# Patient Record
Sex: FEMALE | Race: WHITE | NOT HISPANIC OR LATINO | Employment: OTHER | URBAN - METROPOLITAN AREA
[De-identification: names, ages, dates, MRNs, and addresses within clinical notes are randomized per-mention and may not be internally consistent; named-entity substitution may affect disease eponyms.]

---

## 2017-04-06 ENCOUNTER — OFFICE VISIT (OUTPATIENT)
Dept: CARDIOLOGY | Facility: MEDICAL CENTER | Age: 82
End: 2017-04-06
Payer: MEDICARE

## 2017-04-06 ENCOUNTER — NON-PROVIDER VISIT (OUTPATIENT)
Dept: CARDIOLOGY | Facility: MEDICAL CENTER | Age: 82
End: 2017-04-06
Payer: MEDICARE

## 2017-04-06 VITALS
OXYGEN SATURATION: 92 % | BODY MASS INDEX: 27.11 KG/M2 | SYSTOLIC BLOOD PRESSURE: 102 MMHG | HEIGHT: 63 IN | WEIGHT: 153 LBS | DIASTOLIC BLOOD PRESSURE: 64 MMHG | HEART RATE: 88 BPM

## 2017-04-06 DIAGNOSIS — I49.5 SICK SINUS SYNDROME (HCC): ICD-10-CM

## 2017-04-06 DIAGNOSIS — I27.20 PULMONARY HTN (HCC): Chronic | ICD-10-CM

## 2017-04-06 DIAGNOSIS — E87.6 HYPOKALEMIA: ICD-10-CM

## 2017-04-06 DIAGNOSIS — Z95.0 CARDIAC PACEMAKER IN SITU: ICD-10-CM

## 2017-04-06 DIAGNOSIS — I10 ESSENTIAL HYPERTENSION: Chronic | ICD-10-CM

## 2017-04-06 DIAGNOSIS — I48.0 PAROXYSMAL ATRIAL FIBRILLATION (HCC): ICD-10-CM

## 2017-04-06 DIAGNOSIS — E78.2 MIXED HYPERLIPIDEMIA: Chronic | ICD-10-CM

## 2017-04-06 PROCEDURE — 1101F PT FALLS ASSESS-DOCD LE1/YR: CPT | Mod: 8P | Performed by: NURSE PRACTITIONER

## 2017-04-06 PROCEDURE — G8432 DEP SCR NOT DOC, RNG: HCPCS | Performed by: NURSE PRACTITIONER

## 2017-04-06 PROCEDURE — G8419 CALC BMI OUT NRM PARAM NOF/U: HCPCS | Performed by: NURSE PRACTITIONER

## 2017-04-06 PROCEDURE — 1036F TOBACCO NON-USER: CPT | Performed by: NURSE PRACTITIONER

## 2017-04-06 PROCEDURE — 4040F PNEUMOC VAC/ADMIN/RCVD: CPT | Mod: 8P | Performed by: NURSE PRACTITIONER

## 2017-04-06 PROCEDURE — 99214 OFFICE O/P EST MOD 30 MIN: CPT | Performed by: NURSE PRACTITIONER

## 2017-04-06 RX ORDER — POTASSIUM CHLORIDE 750 MG/1
10 TABLET, FILM COATED, EXTENDED RELEASE ORAL DAILY
Qty: 90 TAB | Refills: 3 | COMMUNITY
Start: 2017-04-06 | End: 2017-08-22 | Stop reason: SDUPTHER

## 2017-04-06 RX ORDER — THIAMINE HCL 50 MG
50 TABLET ORAL DAILY
COMMUNITY
End: 2018-06-19

## 2017-04-06 ASSESSMENT — ENCOUNTER SYMPTOMS
PALPITATIONS: 0
MYALGIAS: 0
ORTHOPNEA: 0
CLAUDICATION: 0
SHORTNESS OF BREATH: 0
COUGH: 0
FEVER: 0
PND: 0
DIZZINESS: 0
ABDOMINAL PAIN: 0

## 2017-04-06 NOTE — PROGRESS NOTES
"Subjective:   Carissa Marin is a 87 y.o. female who presents today for 6 month follow up with pacer check.    She is a patient of Dr. Hawley in our office. Hx of HLD, HTN, PAF, and sinus bradycardia with PPM.    She is overall doing very well. She continues to walk and stay active. She has no complaints.    She does have some chest twinges that are sharp at times but she moves her body around and they go away, she thinks \"they are her old muscles.\"    She has had no episodes of chest pain, palpitations, dizziness/lightheadedness, shortness of breath, orthopnea, or peripheral edema.    Past Medical History   Diagnosis Date   • Pain in joint, shoulder region      right shoulder   • Nonspecific abnormal results of thyroid function study    • Hypopotassemia    • Atrial fibrillation (CMS-HCC)    • Routine general medical examination at a health care facility    • Herpes simplex without mention of complication    • ASTHMA    • Hypertension    • Need for prophylactic hormone replacement therapy (postmenopausal)    • Pneumonia, organism unspecified    • Unspecified spontaneous  without mention of complication    • Abdominal pain, other specified site      pelvic   • Hyperlipidemia    • Lump or mass in breast    • Osteoarthrosis, unspecified whether generalized or localized, lower leg    • Other and unspecified hyperlipidemia 2012   • AF (atrial fibrillation) (CMS-[facility]) 2012   • Hyperglycemia 2012   • Colon polyps 2012   • Palpitations 10/1/2012   • Sinus bradycardia 10/1/2012   • Pulmonary hypertension (CMS-[facility])      Past Surgical History   Procedure Laterality Date   • Abdominal hysterectomy total       tahbso in [place] , not cancerous   • Tonsillectomy     • Dilation and curettage     • Pacemaker insertion       Family History   Problem Relation Age of Onset   • Heart Disease Father      CAD     History   Smoking status   • Never Smoker    Smokeless tobacco   • Never Used     Allergies " "  Allergen Reactions   • Niacin      Bad hot rush, \"on fire\"     Outpatient Encounter Prescriptions as of 4/6/2017   Medication Sig Dispense Refill   • thiamine (THIAMINE) 50 MG Tab Take 50 mg by mouth every day.     • potassium chloride ER (KLOR-CON) 10 MEQ tablet Take 1 Tab by mouth every day. 90 Tab 3   • atorvastatin (LIPITOR) 20 MG Tab Take 1 Tab by mouth every day. 90 Tab 3   • valsartan-hydrochlorothiazide (DIOVAN-HCT) 160-12.5 MG per tablet Take 1 Tab by mouth every day.     • Magnesium 400 MG CAPS Take  by mouth every day.     • aspirin EC (ECOTRIN) 81 MG TBEC Take 81 mg by mouth every day.     • glucosamine Sulfate 500 MG CAPS Take 500 mg by mouth 2 times a day, with meals.       • VITAMIN D, CHOLECALCIFEROL, PO Take  by mouth.       • [DISCONTINUED] potassium chloride ER (KLOR-CON) 10 MEQ tablet Take 1 Tab by mouth 2 times a day. (Patient taking differently: Take 10 mEq by mouth every day.) 180 Tab 1     No facility-administered encounter medications on file as of 4/6/2017.     Review of Systems   Constitutional: Negative for fever and malaise/fatigue.   Respiratory: Negative for cough and shortness of breath.    Cardiovascular: Negative for chest pain, palpitations, orthopnea, claudication, leg swelling and PND.   Gastrointestinal: Negative for abdominal pain.   Musculoskeletal: Negative for myalgias.   Neurological: Negative for dizziness.   All other systems reviewed and are negative.       Objective:   /64 mmHg  Pulse 88  Ht 1.6 m (5' 3\")  Wt 69.4 kg (153 lb)  BMI 27.11 kg/m2  SpO2 92%    Physical Exam   Constitutional: She is oriented to person, place, and time. She appears well-developed and well-nourished. No distress.   HENT:   Head: Normocephalic and atraumatic.   Eyes: EOM are normal.   Neck: Normal range of motion. No JVD present.   Cardiovascular: Normal rate, regular rhythm, normal heart sounds and intact distal pulses.    No murmur heard.  Pulmonary/Chest: Effort normal and breath " sounds normal. No respiratory distress.   Abdominal: Soft. Bowel sounds are normal.   Musculoskeletal: Normal range of motion. She exhibits no edema.   Neurological: She is alert and oriented to person, place, and time.   Skin: Skin is warm and dry.   Psychiatric: She has a normal mood and affect.   Nursing note and vitals reviewed.    Lab Results   Component Value Date/Time    CHOLESTEROL, 03/31/2017 08:30 AM    LDL 80 03/31/2017 08:30 AM    HDL 70 03/31/2017 08:30 AM    TRIGLYCERIDES 70 03/31/2017 08:30 AM       Lab Results   Component Value Date/Time    SODIUM 139 03/31/2017 08:30 AM    POTASSIUM 4.3 03/31/2017 08:30 AM    CHLORIDE 105 03/31/2017 08:30 AM    CO2 28 03/31/2017 08:30 AM    GLUCOSE 106* 03/31/2017 08:30 AM    BUN 22* 03/31/2017 08:30 AM    CREATININE 1.0 03/31/2017 08:30 AM     Lab Results   Component Value Date/Time    ALKALINE PHOSPHATASE 79 03/31/2017 08:30 AM    AST(SGOT) 14 03/31/2017 08:30 AM    ALT(SGPT) 22 03/31/2017 08:30 AM    TOTAL BILIRUBIN 0.6 03/31/2017 08:30 AM      Assessment:     1. Paroxysmal atrial fibrillation (CMS-HCC)     2. Cardiac pacemaker in situ     3. Essential hypertension     4. Pulmonary HTN (CMS-HCC)     5. Mixed hyperlipidemia     6. Hypokalemia  potassium chloride ER (KLOR-CON) 10 MEQ tablet     Medical Decision Making:  Today's Assessment / Status / Plan:     1. PAF, no events per device check. Continue to follow on device checks. ASA only.    2. PPM, good battery life and no mode switching. Q6 month checks.    3. HTN, great control, low side of normal. Continue valsartan-HCTZ 160-12.5 mg QD. Wouldn't increase dosing unless warranted by SBP >140 consistently. She will call if SBP <100.    4. Pulmonary HTN, no recent echo. Previous smoking hx. Asymptomatic. Follow clinically and echo only if symptoms progress.    5. HLD, great control on lipitor. LDL goal <100. Follow with yearly CMP and lipid.    FU in clinic in 6 months with ST with pacer    Patient  verbalizes understanding and agrees with the plan of care.     Collaborating MD: Quinten GUEVARA    Spent 45 minutes in face-to-face patient contact in which greater than 50% of the visit was spent in counseling/coordination of care of medication review, symptom review, discussion of medications and plan of care.

## 2017-04-06 NOTE — MR AVS SNAPSHOT
"        Carissa Tomas   2017 2:20 PM   Office Visit   MRN: [id number]    Department:  [facility]   Dept Phone:  [phone]    Description:  Female : 1930   Provider:  MEENU Vaughn           Reason for Visit     Follow-Up           Allergies as of 2017     Allergen Noted Reactions    Niacin 2012       Bad hot rush, \"on fire\"      You were diagnosed with     Paroxysmal atrial fibrillation (CMS-HCC)   [076359]       Cardiac pacemaker in situ   [V45.01.ICD-9-CM]       Essential hypertension   [8843462]       Pulmonary HTN (CMS-HCC)   [219044]       Mixed hyperlipidemia   [272.2.ICD-9-CM]       Hypokalemia   [396192]         Vital Signs     Blood Pressure Pulse Height Weight Body Mass Index Oxygen Saturation    102/64 mmHg 88 1.6 m (5' 3\") 69.4 kg (153 lb) 27.11 kg/m2 92%    Smoking Status                   Never Smoker            Basic Information     Date Of Birth Sex Race Ethnicity Preferred Language    1930 Female White Non- English      Problem List              ICD-10-CM Priority Class Noted - Resolved    Hyperlipemia (Chronic) E78.5 Medium  2012 - Present    AF (atrial fibrillation) (CMS-HCC) I48.91 Medium  2012 - Present    Herpes simplex virus infection (Chronic) B00.9 Low  2012 - Present    HTN (hypertension) (Chronic) I10 Medium  2012 - Present    OA (osteoarthritis) (Chronic) M19.90 Low  2012 - Present    Pulmonary HTN (CMS-HCC) (Chronic) I27.2 Medium  2012 - Present    Colon polyps (Chronic) K63.5 Low  2012 - Present    Cardiac pacemaker in situ Z95.0 Medium  2013 - Present      Health Maintenance        Date Due Completion Dates    IMM DTaP/Tdap/Td Vaccine (1 - Tdap) 1949 ---    PAP SMEAR 1951 ---    MAMMOGRAM 1970 ---    COLONOSCOPY 1980 ---    IMM ZOSTER VACCINE 1990 ---    BONE DENSITY 1995 ---    IMM PNEUMOCOCCAL 65+ (ADULT) LOW/MEDIUM RISK SERIES (1 of 2 - PCV13) 1995 " ---            Current Immunizations     No immunizations on file.      Below and/or attached are the medications your provider expects you to take. Review all of your home medications and newly ordered medications with your provider and/or pharmacist. Follow medication instructions as directed by your provider and/or pharmacist. Please keep your medication list with you and share with your provider. Update the information when medications are discontinued, doses are changed, or new medications (including over-the-counter products) are added; and carry medication information at all times in the event of emergency situations     Allergies:  NIACIN - (reactions not documented)               Medications  Valid as of: April 06, 2017 -  4:11 PM    Generic Name Brand Name Tablet Size Instructions for use    Aspirin (Tablet Delayed Response) ECOTRIN 81 MG Take 81 mg by mouth every day.        Atorvastatin Calcium (Tab) LIPITOR 20 MG Take 1 Tab by mouth every day.        Cholecalciferol   Take  by mouth.          Glucosamine Sulfate (Cap) glucosamine Sulfate 500 MG Take 500 mg by mouth 2 times a day, with meals.          Magnesium (Cap) Magnesium 400 MG Take  by mouth every day.        Potassium Chloride (Tab CR) KLOR-CON 10 MEQ Take 1 Tab by mouth every day.        Thiamine HCl (Tab) THIAMINE 50 MG Take 50 mg by mouth every day.        Valsartan-Hydrochlorothiazide (Tab) DIOVAN--12.5 MG Take 1 Tab by mouth every day.        .                 Medicines prescribed today were sent to:     [facility] - [address] [facility] [address]    Phone: [phone] Fax: [phone]    Open 24 Hours?: No      Medication refill instructions:       If your prescription bottle indicates you have medication refills left, it is not necessary to call your provider’s office. Please contact your pharmacy and they will refill your medication.    If your prescription  bottle indicates you do not have any refills left, you may request refills at any time through one of the following ways: The online Actions system (except Urgent Care), by calling your provider’s office, or by asking your pharmacy to contact your provider’s office with a refill request. Medication refills are processed only during regular business hours and may not be available until the next business day. Your provider may request additional information or to have a follow-up visit with you prior to refilling your medication.   *Please Note: Medication refills are assigned a new Rx number when refilled electronically. Your pharmacy may indicate that no refills were authorized even though a new prescription for the same medication is available at the pharmacy. Please request the medicine by name with the pharmacy before contacting your provider for a refill.           MyChart Status: Patient Declined

## 2017-04-06 NOTE — Clinical Note
"     [facility]   [address], [address]  [place] [address]  Phone: [phone]  Fax: [phone]              Carissa Marin  1930    Encounter Date: 2017    MEENU Vaughn          PROGRESS NOTE:  Subjective:   Carissa Marin is a 87 y.o. female who presents today for 6 month follow up with pacer check.    She is a patient of Dr. Hawlye in our office. Hx of HLD, HTN, PAF, and sinus bradycardia with PPM.    She is overall doing very well. She continues to walk and stay active. She has no complaints.    She does have some chest twinges that are sharp at times but she moves her body around and they go away, she thinks \"they are her old muscles.\"    She has had no episodes of chest pain, palpitations, dizziness/lightheadedness, shortness of breath, orthopnea, or peripheral edema.    Past Medical History   Diagnosis Date   • Pain in joint, shoulder region      right shoulder   • Nonspecific abnormal results of thyroid function study    • Hypopotassemia    • Atrial fibrillation (CMS-HCC)    • Routine general medical examination at a health care facility    • Herpes simplex without mention of complication    • ASTHMA    • Hypertension    • Need for prophylactic hormone replacement therapy (postmenopausal)    • Pneumonia, organism unspecified    • Unspecified spontaneous  without mention of complication    • Abdominal pain, other specified site      pelvic   • Hyperlipidemia    • Lump or mass in breast    • Osteoarthrosis, unspecified whether generalized or localized, lower leg    • Other and unspecified hyperlipidemia 2012   • AF (atrial fibrillation) (CMS-HCC) 2012   • Hyperglycemia 2012   • Colon polyps 2012   • Palpitations 10/1/2012   • Sinus bradycardia 10/1/2012   • Pulmonary hypertension (CMS-[facility])      Past Surgical History   Procedure Laterality Date   • Abdominal hysterectomy total       tahbso in [place] , not " "cancerous   • Tonsillectomy     • Dilation and curettage     • Pacemaker insertion       Family History   Problem Relation Age of Onset   • Heart Disease Father      CAD     History   Smoking status   • Never Smoker    Smokeless tobacco   • Never Used     Allergies   Allergen Reactions   • Niacin      Bad hot rush, \"on fire\"     Outpatient Encounter Prescriptions as of 4/6/2017   Medication Sig Dispense Refill   • thiamine (THIAMINE) 50 MG Tab Take 50 mg by mouth every day.     • potassium chloride ER (KLOR-CON) 10 MEQ tablet Take 1 Tab by mouth every day. 90 Tab 3   • atorvastatin (LIPITOR) 20 MG Tab Take 1 Tab by mouth every day. 90 Tab 3   • valsartan-hydrochlorothiazide (DIOVAN-HCT) 160-12.5 MG per tablet Take 1 Tab by mouth every day.     • Magnesium 400 MG CAPS Take  by mouth every day.     • aspirin EC (ECOTRIN) 81 MG TBEC Take 81 mg by mouth every day.     • glucosamine Sulfate 500 MG CAPS Take 500 mg by mouth 2 times a day, with meals.       • VITAMIN D, CHOLECALCIFEROL, PO Take  by mouth.       • [DISCONTINUED] potassium chloride ER (KLOR-CON) 10 MEQ tablet Take 1 Tab by mouth 2 times a day. (Patient taking differently: Take 10 mEq by mouth every day.) 180 Tab 1     No facility-administered encounter medications on file as of 4/6/2017.     Review of Systems   Constitutional: Negative for fever and malaise/fatigue.   Respiratory: Negative for cough and shortness of breath.    Cardiovascular: Negative for chest pain, palpitations, orthopnea, claudication, leg swelling and PND.   Gastrointestinal: Negative for abdominal pain.   Musculoskeletal: Negative for myalgias.   Neurological: Negative for dizziness.   All other systems reviewed and are negative.       Objective:   /64 mmHg  Pulse 88  Ht 1.6 m (5' 3\")  Wt 69.4 kg (153 lb)  BMI 27.11 kg/m2  SpO2 92%    Physical Exam   Constitutional: She is oriented to person, place, and time. She appears well-developed and well-nourished. No distress.   HENT: "   Head: Normocephalic and atraumatic.   Eyes: EOM are normal.   Neck: Normal range of motion. No JVD present.   Cardiovascular: Normal rate, regular rhythm, normal heart sounds and intact distal pulses.    No murmur heard.  Pulmonary/Chest: Effort normal and breath sounds normal. No respiratory distress.   Abdominal: Soft. Bowel sounds are normal.   Musculoskeletal: Normal range of motion. She exhibits no edema.   Neurological: She is alert and oriented to person, place, and time.   Skin: Skin is warm and dry.   Psychiatric: She has a normal mood and affect.   Nursing note and vitals reviewed.    Lab Results   Component Value Date/Time    CHOLESTEROL, 03/31/2017 08:30 AM    LDL 80 03/31/2017 08:30 AM    HDL 70 03/31/2017 08:30 AM    TRIGLYCERIDES 70 03/31/2017 08:30 AM       Lab Results   Component Value Date/Time    SODIUM 139 03/31/2017 08:30 AM    POTASSIUM 4.3 03/31/2017 08:30 AM    CHLORIDE 105 03/31/2017 08:30 AM    CO2 28 03/31/2017 08:30 AM    GLUCOSE 106* 03/31/2017 08:30 AM    BUN 22* 03/31/2017 08:30 AM    CREATININE 1.0 03/31/2017 08:30 AM     Lab Results   Component Value Date/Time    ALKALINE PHOSPHATASE 79 03/31/2017 08:30 AM    AST(SGOT) 14 03/31/2017 08:30 AM    ALT(SGPT) 22 03/31/2017 08:30 AM    TOTAL BILIRUBIN 0.6 03/31/2017 08:30 AM      Assessment:     1. Paroxysmal atrial fibrillation (CMS-[facility])     2. Cardiac pacemaker in situ     3. Essential hypertension     4. Pulmonary HTN (CMS-[facility])     5. Mixed hyperlipidemia     6. Hypokalemia  potassium chloride ER (KLOR-CON) 10 MEQ tablet     Medical Decision Making:  Today's Assessment / Status / Plan:     1. PAF, no events per device check. Continue to follow on device checks. ASA only.    2. PPM, good battery life and no mode switching. Q6 month checks.    3. HTN, great control, low side of normal. Continue valsartan-HCTZ 160-12.5 mg QD. Wouldn't increase dosing unless warranted by SBP >140 consistently. She will call if SBP <100.    4.  Pulmonary HTN, no recent echo. Previous smoking hx. Asymptomatic. Follow clinically and echo only if symptoms progress.    5. HLD, great control on lipitor. LDL goal <100. Follow with yearly CMP and lipid.    FU in clinic in 6 months with ST with pacer    Patient verbalizes understanding and agrees with the plan of care.     Collaborating MD: Quinten GUEVARA    Spent 45 minutes in face-to-face patient contact in which greater than 50% of the visit was spent in counseling/coordination of care of medication review, symptom review, discussion of medications and plan of care.        No Recipients

## 2017-05-30 DIAGNOSIS — I10 ESSENTIAL HYPERTENSION: Chronic | ICD-10-CM

## 2017-05-30 DIAGNOSIS — E78.5 HYPERLIPIDEMIA, UNSPECIFIED HYPERLIPIDEMIA TYPE: ICD-10-CM

## 2017-05-30 RX ORDER — ATORVASTATIN CALCIUM 20 MG/1
20 TABLET, FILM COATED ORAL DAILY
Qty: 90 TAB | Refills: 3 | Status: SHIPPED | OUTPATIENT
Start: 2017-05-30 | End: 2017-09-21 | Stop reason: SDUPTHER

## 2017-08-22 DIAGNOSIS — E87.6 HYPOKALEMIA: ICD-10-CM

## 2017-08-22 RX ORDER — POTASSIUM CHLORIDE 750 MG/1
10 TABLET, FILM COATED, EXTENDED RELEASE ORAL DAILY
Qty: 90 TAB | Refills: 3 | Status: SHIPPED | OUTPATIENT
Start: 2017-08-22 | End: 2018-06-19

## 2017-09-21 ENCOUNTER — TELEPHONE (OUTPATIENT)
Dept: CARDIOLOGY | Facility: MEDICAL CENTER | Age: 82
End: 2017-09-21

## 2017-09-21 DIAGNOSIS — I10 ESSENTIAL HYPERTENSION: Chronic | ICD-10-CM

## 2017-09-21 DIAGNOSIS — E78.5 HYPERLIPIDEMIA, UNSPECIFIED HYPERLIPIDEMIA TYPE: ICD-10-CM

## 2017-09-21 RX ORDER — ATORVASTATIN CALCIUM 20 MG/1
20 TABLET, FILM COATED ORAL DAILY
Qty: 90 TAB | Refills: 0 | Status: SHIPPED | OUTPATIENT
Start: 2017-09-21 | End: 2017-12-11 | Stop reason: SDUPTHER

## 2017-09-21 NOTE — TELEPHONE ENCOUNTER
----- Message from Annmarie Durham sent at 9/21/2017  9:17 AM PDT -----  Regarding: Patient lost prescription  [facility]/Jorge    Patient lost her prescription for Atorvastatin and wants a refill called in to [facility]. She can be reached at [phone].

## 2017-11-28 ENCOUNTER — TELEPHONE (OUTPATIENT)
Dept: CARDIOLOGY | Facility: MEDICAL CENTER | Age: 82
End: 2017-11-28

## 2017-11-28 ENCOUNTER — NON-PROVIDER VISIT (OUTPATIENT)
Dept: CARDIOLOGY | Facility: MEDICAL CENTER | Age: 82
End: 2017-11-28
Payer: MEDICARE

## 2017-11-28 ENCOUNTER — OFFICE VISIT (OUTPATIENT)
Dept: CARDIOLOGY | Facility: MEDICAL CENTER | Age: 82
End: 2017-11-28
Payer: MEDICARE

## 2017-11-28 VITALS
BODY MASS INDEX: 26.58 KG/M2 | HEIGHT: 63 IN | HEART RATE: 82 BPM | WEIGHT: 150 LBS | SYSTOLIC BLOOD PRESSURE: 122 MMHG | OXYGEN SATURATION: 94 % | DIASTOLIC BLOOD PRESSURE: 70 MMHG

## 2017-11-28 DIAGNOSIS — E78.2 MIXED HYPERLIPIDEMIA: Chronic | ICD-10-CM

## 2017-11-28 DIAGNOSIS — I49.5 SICK SINUS SYNDROME (HCC): ICD-10-CM

## 2017-11-28 DIAGNOSIS — I27.20 PULMONARY HTN (HCC): Chronic | ICD-10-CM

## 2017-11-28 DIAGNOSIS — I48.0 PAROXYSMAL ATRIAL FIBRILLATION (HCC): ICD-10-CM

## 2017-11-28 DIAGNOSIS — I10 ESSENTIAL HYPERTENSION: Chronic | ICD-10-CM

## 2017-11-28 DIAGNOSIS — Z95.0 CARDIAC PACEMAKER IN SITU: ICD-10-CM

## 2017-11-28 PROCEDURE — 99214 OFFICE O/P EST MOD 30 MIN: CPT | Performed by: NURSE PRACTITIONER

## 2017-11-28 PROCEDURE — 93280 PM DEVICE PROGR EVAL DUAL: CPT | Performed by: INTERNAL MEDICINE

## 2017-11-28 RX ORDER — VALSARTAN 160 MG/1
160 TABLET ORAL DAILY
Qty: 90 TAB | Refills: 3 | Status: SHIPPED | OUTPATIENT
Start: 2017-11-28 | End: 2018-06-21 | Stop reason: SDUPTHER

## 2017-11-28 ASSESSMENT — ENCOUNTER SYMPTOMS
CLAUDICATION: 0
PND: 0
DIZZINESS: 1
PALPITATIONS: 0
ABDOMINAL PAIN: 0
MYALGIAS: 0
SHORTNESS OF BREATH: 0
COUGH: 0
FEVER: 0
ORTHOPNEA: 0

## 2017-11-28 NOTE — TELEPHONE ENCOUNTER
S/w pt's dtr, she just want to make sure that [facility] instructed them that pt will be taking Valsartan 160mg only without HCTZ and pt should stopped KCL after two weeks and repeat blood test in two weeks after KCL was stopped, clarified and confirmed this w/ pt's Dtr based on [facility] OV notes.

## 2017-11-28 NOTE — PROGRESS NOTES
Subjective:   Carissa Marin is a 87 y.o. female who presents today for 6 month follow up with pacer check.    She is a previous patient of Dr. Hawley in our office, we will get her set up with another physician in our group. Hx of HLD, HTN, PAF, and symptomatic bradycardia with PPM placement.    She is overall doing very well. She continues to walk and stay active.     She only complains of mild lightheadedness in the morning. She doesn't drink much water per her daughter.    She has had no episodes of chest pain, palpitations, shortness of breath, orthopnea, or peripheral edema.    Past Medical History:   Diagnosis Date   • Abdominal pain, other specified site     pelvic   • AF (atrial fibrillation) (CMS-[facility]) 2012   • ASTHMA    • Atrial fibrillation (CMS-[facility])    • Colon polyps 2012   • Herpes simplex without mention of complication    • Hyperglycemia 2012   • Hyperlipidemia    • Hypertension    • Hypopotassemia    • Lump or mass in breast    • Need for prophylactic hormone replacement therapy (postmenopausal)    • Nonspecific abnormal results of thyroid function study    • Osteoarthrosis, unspecified whether generalized or localized, lower leg    • Other and unspecified hyperlipidemia 2012   • Pain in joint, shoulder region     right shoulder   • Palpitations 10/1/2012   • Pneumonia, organism unspecified(486)    • Pulmonary hypertension    • Routine general medical examination at a health care facility    • Sinus bradycardia 10/1/2012   • Unspecified spontaneous  without mention of complication      Past Surgical History:   Procedure Laterality Date   • ABDOMINAL HYSTERECTOMY TOTAL      tahbso in [place] , not cancerous   • DILATION AND CURETTAGE     • PACEMAKER INSERTION     • TONSILLECTOMY       Family History   Problem Relation Age of Onset   • Heart Disease Father      CAD     History   Smoking Status   • Never Smoker   Smokeless Tobacco   • Never Used     Allergies   Allergen  "Reactions   • Niacin      Bad hot rush, \"on fire\"     Outpatient Encounter Prescriptions as of 11/28/2017   Medication Sig Dispense Refill   • B Complex Vitamins (B COMPLEX PO) Take  by mouth.     • atorvastatin (LIPITOR) 20 MG Tab Take 1 Tab by mouth every day. 90 Tab 0   • potassium chloride ER (KLOR-CON) 10 MEQ tablet Take 1 Tab by mouth every day. 90 Tab 3   • thiamine (THIAMINE) 50 MG Tab Take 50 mg by mouth every day.     • valsartan-hydrochlorothiazide (DIOVAN-HCT) 160-12.5 MG per tablet Take 1 Tab by mouth every day.     • Magnesium 400 MG CAPS Take  by mouth every day.     • aspirin EC (ECOTRIN) 81 MG TBEC Take 81 mg by mouth every day.     • glucosamine Sulfate 500 MG CAPS Take 500 mg by mouth 2 times a day, with meals.       • VITAMIN D, CHOLECALCIFEROL, PO Take  by mouth.         No facility-administered encounter medications on file as of 11/28/2017.      Review of Systems   Constitutional: Negative for fever and malaise/fatigue.   Respiratory: Negative for cough and shortness of breath.    Cardiovascular: Negative for chest pain, palpitations, orthopnea, claudication, leg swelling and PND.   Gastrointestinal: Negative for abdominal pain.   Musculoskeletal: Negative for myalgias.   Neurological: Positive for dizziness.   All other systems reviewed and are negative.       Objective:   /70   Pulse 82   Ht 1.6 m (5' 3\")   Wt 68 kg (150 lb)   SpO2 94%   BMI 26.57 kg/m²     Physical Exam   Constitutional: She is oriented to person, place, and time. She appears well-developed and well-nourished. No distress.   HENT:   Head: Normocephalic and atraumatic.   Eyes: EOM are normal.   Neck: Normal range of motion. No JVD present.   Cardiovascular: Normal rate, regular rhythm, normal heart sounds and intact distal pulses.    No murmur heard.  Pulmonary/Chest: Effort normal and breath sounds normal. No respiratory distress.   Abdominal: Soft. Bowel sounds are normal.   Musculoskeletal: Normal range of " motion. She exhibits no edema.   Neurological: She is alert and oriented to person, place, and time.   Skin: Skin is warm and dry.   Psychiatric: She has a normal mood and affect.   Nursing note and vitals reviewed.    Lab Results   Component Value Date/Time    CHOLSTRLTOT 154 03/31/2017 08:30 AM    LDL 80 03/31/2017 08:30 AM    HDL 70 03/31/2017 08:30 AM    TRIGLYCERIDE 70 03/31/2017 08:30 AM       Lab Results   Component Value Date/Time    SODIUM 139 03/31/2017 08:30 AM    POTASSIUM 4.3 03/31/2017 08:30 AM    CHLORIDE 105 03/31/2017 08:30 AM    CO2 28 03/31/2017 08:30 AM    GLUCOSE 106 (H) 03/31/2017 08:30 AM    BUN 22 (H) 03/31/2017 08:30 AM    CREATININE 1.0 03/31/2017 08:30 AM     Lab Results   Component Value Date/Time    ALKPHOSPHAT 79 03/31/2017 08:30 AM    ASTSGOT 14 03/31/2017 08:30 AM    ALTSGPT 22 03/31/2017 08:30 AM    TBILIRUBIN 0.6 03/31/2017 08:30 AM      Assessment:     1. Paroxysmal atrial fibrillation (CMS-HCC)     2. Cardiac pacemaker in situ     3. Essential hypertension     4. Mixed hyperlipidemia     5. Pulmonary HTN (CMS-HCC)       Medical Decision Making:  Today's Assessment / Status / Plan:     1. PAF, no events per device check. Continue to follow on device checks. ASA only.    2. PPM, good battery life and no mode switching. Q6 month checks.    3. HTN, great control, low side of normal. Drop HCTZ and continue with just valsartan 160 due to lightheadedness and not staying hydrated. Check BP daily with this change and will check K in a few weeks. If K remains elevated, okay to stop K supp.    4. Pulmonary HTN, no recent echo. Previous smoking hx. Asymptomatic. Follow clinically and echo only if symptoms progress.    5. HLD, great control on lipitor. LDL goal <100. Follow with yearly CMP and lipid.    FU in clinic in 6 months with LA with pacer; yearly CMP and lipid before next apt    Patient verbalizes understanding and agrees with the plan of care.     Collaborating MD: Haider GUEVARA

## 2017-11-28 NOTE — LETTER
[facility]   [address]  [place] [address]  Phone: [phone]  Fax: [phone]              Carissa Marin  1930    Encounter Date: 2017    MEENU Vaughn          PROGRESS NOTE:  Subjective:   Carissa Marin is a 87 y.o. female who presents today for 6 month follow up with pacer check.    She is a previous patient of Dr. Hawley in our office, we will get her set up with another physician in our group. Hx of HLD, HTN, PAF, and symptomatic bradycardia with PPM placement.    She is overall doing very well. She continues to walk and stay active.     She only complains of mild lightheadedness in the morning. She doesn't drink much water per her daughter.    She has had no episodes of chest pain, palpitations, shortness of breath, orthopnea, or peripheral edema.    Past Medical History:   Diagnosis Date   • Abdominal pain, other specified site     pelvic   • AF (atrial fibrillation) (CMS-HCC) 2012   • ASTHMA    • Atrial fibrillation (CMS-HCC)    • Colon polyps 2012   • Herpes simplex without mention of complication    • Hyperglycemia 2012   • Hyperlipidemia    • Hypertension    • Hypopotassemia    • Lump or mass in breast    • Need for prophylactic hormone replacement therapy (postmenopausal)    • Nonspecific abnormal results of thyroid function study    • Osteoarthrosis, unspecified whether generalized or localized, lower leg    • Other and unspecified hyperlipidemia 2012   • Pain in joint, shoulder region     right shoulder   • Palpitations 10/1/2012   • Pneumonia, organism unspecified(486)    • Pulmonary hypertension    • Routine general medical examination at a health care facility    • Sinus bradycardia 10/1/2012   • Unspecified spontaneous  without mention of complication      Past Surgical History:   Procedure Laterality Date   • ABDOMINAL HYSTERECTOMY TOTAL      tahbso in [place] , not  "cancerous   • DILATION AND CURETTAGE     • PACEMAKER INSERTION     • TONSILLECTOMY       Family History   Problem Relation Age of Onset   • Heart Disease Father      CAD     History   Smoking Status   • Never Smoker   Smokeless Tobacco   • Never Used     Allergies   Allergen Reactions   • Niacin      Bad hot rush, \"on fire\"     Outpatient Encounter Prescriptions as of 11/28/2017   Medication Sig Dispense Refill   • B Complex Vitamins (B COMPLEX PO) Take  by mouth.     • atorvastatin (LIPITOR) 20 MG Tab Take 1 Tab by mouth every day. 90 Tab 0   • potassium chloride ER (KLOR-CON) 10 MEQ tablet Take 1 Tab by mouth every day. 90 Tab 3   • thiamine (THIAMINE) 50 MG Tab Take 50 mg by mouth every day.     • valsartan-hydrochlorothiazide (DIOVAN-HCT) 160-12.5 MG per tablet Take 1 Tab by mouth every day.     • Magnesium 400 MG CAPS Take  by mouth every day.     • aspirin EC (ECOTRIN) 81 MG TBEC Take 81 mg by mouth every day.     • glucosamine Sulfate 500 MG CAPS Take 500 mg by mouth 2 times a day, with meals.       • VITAMIN D, CHOLECALCIFEROL, PO Take  by mouth.         No facility-administered encounter medications on file as of 11/28/2017.      Review of Systems   Constitutional: Negative for fever and malaise/fatigue.   Respiratory: Negative for cough and shortness of breath.    Cardiovascular: Negative for chest pain, palpitations, orthopnea, claudication, leg swelling and PND.   Gastrointestinal: Negative for abdominal pain.   Musculoskeletal: Negative for myalgias.   Neurological: Positive for dizziness.   All other systems reviewed and are negative.       Objective:   /70   Pulse 82   Ht 1.6 m (5' 3\")   Wt 68 kg (150 lb)   SpO2 94%   BMI 26.57 kg/m²      Physical Exam   Constitutional: She is oriented to person, place, and time. She appears well-developed and well-nourished. No distress.   HENT:   Head: Normocephalic and atraumatic.   Eyes: EOM are normal.   Neck: Normal range of motion. No JVD present.   "   Cardiovascular: Normal rate, regular rhythm, normal heart sounds and intact distal pulses.    No murmur heard.  Pulmonary/Chest: Effort normal and breath sounds normal. No respiratory distress.   Abdominal: Soft. Bowel sounds are normal.   Musculoskeletal: Normal range of motion. She exhibits no edema.   Neurological: She is alert and oriented to person, place, and time.   Skin: Skin is warm and dry.   Psychiatric: She has a normal mood and affect.   Nursing note and vitals reviewed.    Lab Results   Component Value Date/Time    CHOLSTRLTOT 154 03/31/2017 08:30 AM    LDL 80 03/31/2017 08:30 AM    HDL 70 03/31/2017 08:30 AM    TRIGLYCERIDE 70 03/31/2017 08:30 AM       Lab Results   Component Value Date/Time    SODIUM 139 03/31/2017 08:30 AM    POTASSIUM 4.3 03/31/2017 08:30 AM    CHLORIDE 105 03/31/2017 08:30 AM    CO2 28 03/31/2017 08:30 AM    GLUCOSE 106 (H) 03/31/2017 08:30 AM    BUN 22 (H) 03/31/2017 08:30 AM    CREATININE 1.0 03/31/2017 08:30 AM     Lab Results   Component Value Date/Time    ALKPHOSPHAT 79 03/31/2017 08:30 AM    ASTSGOT 14 03/31/2017 08:30 AM    ALTSGPT 22 03/31/2017 08:30 AM    TBILIRUBIN 0.6 03/31/2017 08:30 AM      Assessment:     1. Paroxysmal atrial fibrillation (CMS-HCC)     2. Cardiac pacemaker in situ     3. Essential hypertension     4. Mixed hyperlipidemia     5. Pulmonary HTN (CMS-[facility])       Medical Decision Making:  Today's Assessment / Status / Plan:     1. PAF, no events per device check. Continue to follow on device checks. ASA only.    2. PPM, good battery life and no mode switching. Q6 month checks.    3. HTN, great control, low side of normal. Drop HCTZ and continue with just valsartan 160 due to lightheadedness and not staying hydrated. Check BP daily with this change and will check K in a few weeks. If K remains elevated, okay to stop K supp.    4. Pulmonary HTN, no recent echo. Previous smoking hx. Asymptomatic. Follow clinically and echo only if symptoms progress.    5.  HLD, great control on lipitor. LDL goal <100. Follow with yearly CMP and lipid.    FU in clinic in 6 months with LA with pacer; yearly CMP and lipid before next apt    Patient verbalizes understanding and agrees with the plan of care.     Collaborating MD: Haider GUEVARA        No Recipients

## 2017-11-28 NOTE — TELEPHONE ENCOUNTER
----- Message from Lillian Young sent at 11/28/2017 11:40 AM PST -----  Regarding: wants to go over medication instructions  SC/Susanne        Patient saw SC today. Patient's daughter Jamia wants to go over the medication instructions with you again. She can be reached at [phone].

## 2017-12-11 DIAGNOSIS — E78.5 HYPERLIPIDEMIA, UNSPECIFIED HYPERLIPIDEMIA TYPE: ICD-10-CM

## 2017-12-11 DIAGNOSIS — I10 ESSENTIAL HYPERTENSION: Chronic | ICD-10-CM

## 2017-12-11 RX ORDER — ATORVASTATIN CALCIUM 20 MG/1
20 TABLET, FILM COATED ORAL DAILY
Qty: 90 TAB | Refills: 3 | Status: SHIPPED | OUTPATIENT
Start: 2017-12-11 | End: 2019-02-01

## 2017-12-26 ENCOUNTER — TELEPHONE (OUTPATIENT)
Dept: CARDIOLOGY | Facility: MEDICAL CENTER | Age: 82
End: 2017-12-26

## 2017-12-26 DIAGNOSIS — R60.0 LOCALIZED EDEMA: ICD-10-CM

## 2017-12-26 DIAGNOSIS — I10 ESSENTIAL HYPERTENSION: Chronic | ICD-10-CM

## 2017-12-26 RX ORDER — HYDROCHLOROTHIAZIDE 12.5 MG/1
12.5 TABLET ORAL DAILY
Qty: 90 TAB | Refills: 3 | Status: SHIPPED | OUTPATIENT
Start: 2017-12-26 | End: 2018-06-19

## 2017-12-26 NOTE — TELEPHONE ENCOUNTER
Carissa was informed.  I ordered HCTZ from [facility].  She will get her lab work done.  I am trying to get her to see Dr. Mendez next week and she will call back.

## 2017-12-26 NOTE — TELEPHONE ENCOUNTER
"She says that she thinks that the swelling in her ankles started about 1-2 weeks ago.  It is non-pitting and very subtle. She stopped Valsartan/HCTZ at the end of November and began Valsartan alone at that time.  This change did not change the lightheadedness she was having.  She does not take her BP.  She has not increased her intake of sodium over the holidays.  She has not yet done her lab work that was ordered by Rupali, but she has the order.      ALSO:  She states that last week (about the 18th) she had some chest pain that woke her up during the night.  She says that she has had chest pain in the past, but \"never like this.\"  It was a 6 on 1/10 scale and she describes it as a pressure band around the middle of her chest.  It lasted 4-5 minutes.  She denies any symptoms such as diaphoresis, jaw/back/arm pain; palpitations.  She states she has no history of a heart attack.  I advised her that if she has it again, to call 911.  She says that she might do that.    To Rupali to advise.  I can make an appointment for her to see SC on Friday (in a TAVR spot), or will have to make it with another provider if she can't see her then.  Also, since she has not had her lab work, when should she have that?  "

## 2017-12-26 NOTE — TELEPHONE ENCOUNTER
----- Message from Ceci Mueller sent at 12/26/2017 10:59 AM PST -----  Regarding: swollen ankles & hands  Contact: [phone]  SC/dain    Pt calling to report swollen ankles & hands for last few weeks, pt had a recent med change.  Please call pt at [phone]

## 2017-12-26 NOTE — TELEPHONE ENCOUNTER
Okay to add back in HCTZ 12.5 mg QOD for edema. Her daughter was thinking she was dehydrated and getting lightheaded and weak due to the diuretic therapy.     Agree with going to ER for chest pain. She has hx of afib, no CAD.     Thank you for update. Okay to take TAVR spot on 12/29/17 but not on 1/5/17. If needs to see provider, okay to see another MD-as she is a [facility] patient and needs to be established anyways.    Thanks, SC

## 2017-12-27 ENCOUNTER — OFFICE VISIT (OUTPATIENT)
Dept: CARDIOLOGY | Facility: MEDICAL CENTER | Age: 82
End: 2017-12-27
Payer: MEDICARE

## 2017-12-27 VITALS
HEIGHT: 63 IN | DIASTOLIC BLOOD PRESSURE: 70 MMHG | BODY MASS INDEX: 26.93 KG/M2 | SYSTOLIC BLOOD PRESSURE: 130 MMHG | HEART RATE: 78 BPM | OXYGEN SATURATION: 95 % | WEIGHT: 152 LBS

## 2017-12-27 DIAGNOSIS — I48.0 PAROXYSMAL ATRIAL FIBRILLATION (HCC): ICD-10-CM

## 2017-12-27 DIAGNOSIS — I10 ESSENTIAL HYPERTENSION: ICD-10-CM

## 2017-12-27 DIAGNOSIS — Z95.0 CARDIAC PACEMAKER IN SITU: ICD-10-CM

## 2017-12-27 DIAGNOSIS — R07.89 CHEST PRESSURE: ICD-10-CM

## 2017-12-27 PROCEDURE — 99214 OFFICE O/P EST MOD 30 MIN: CPT | Performed by: INTERNAL MEDICINE

## 2017-12-27 PROCEDURE — 93000 ELECTROCARDIOGRAM COMPLETE: CPT | Performed by: INTERNAL MEDICINE

## 2017-12-27 RX ORDER — NITROGLYCERIN 0.4 MG/1
0.4 TABLET SUBLINGUAL PRN
Qty: 25 TAB | Refills: 11 | Status: SHIPPED | OUTPATIENT
Start: 2017-12-27 | End: 2020-09-29

## 2017-12-27 ASSESSMENT — ENCOUNTER SYMPTOMS
FEVER: 0
BRUISES/BLEEDS EASILY: 0
SHORTNESS OF BREATH: 0
ORTHOPNEA: 0
COUGH: 0
ABDOMINAL PAIN: 0
LOSS OF CONSCIOUSNESS: 0
DIZZINESS: 0
CHILLS: 0
NAUSEA: 0
PND: 0
HEADACHES: 0
MYALGIAS: 0
PALPITATIONS: 0

## 2017-12-27 NOTE — LETTER
[facility]   [address]   [address]  [place] [address]  Phone: [phone]  Fax: [phone]              Carissa Marin  1930    Encounter Date: 2017    Adriel Mendez M.D.          PROGRESS NOTE:  Subjective:   Carissa Marin is a 87 y.o. female who presents today Concerned about an episode that awakened her from sleep at approximately  with retrosternal pressure across her chest that lasted for 5 minutes and then spontaneously resolved with no recurrence at all since then.  She needs a limited lifestyle. She has done some walking with [place] shopping and has had no further chest discomfort palpitations or effort dyspnea.  She reports a slight bit of pedal edema and ankle edema late in the day.    Today's EKG demonstrates a regular rhythm with a right bundle branch block that is not paced. Right bundle branch block appears to be new compared to EKG in       Past Medical History:   Diagnosis Date   • Abdominal pain, other specified site     pelvic   • AF (atrial fibrillation) (CMS-HCC) 2012   • ASTHMA    • Atrial fibrillation (CMS-[facility])    • Colon polyps 2012   • Herpes simplex without mention of complication    • Hyperglycemia 2012   • Hyperlipidemia    • Hypertension    • Hypopotassemia    • Lump or mass in breast    • Need for prophylactic hormone replacement therapy (postmenopausal)    • Nonspecific abnormal results of thyroid function study    • Osteoarthrosis, unspecified whether generalized or localized, lower leg    • Other and unspecified hyperlipidemia 2012   • Pain in joint, shoulder region     right shoulder   • Palpitations 10/1/2012   • Pneumonia, organism unspecified(486)    • Pulmonary hypertension    • Routine general medical examination at a health care facility    • Sinus bradycardia 10/1/2012   • Unspecified spontaneous  without mention of complication       "    Past Surgical History:   Procedure Laterality Date   • ABDOMINAL HYSTERECTOMY TOTAL  2002    tahbso in [place] , not cancerous   • DILATION AND CURETTAGE     • PACEMAKER INSERTION     • TONSILLECTOMY       Family History   Problem Relation Age of Onset   • Heart Disease Father      CAD     History   Smoking Status   • Never Smoker   Smokeless Tobacco   • Never Used     Allergies   Allergen Reactions   • Niacin      Bad hot rush, \"on fire\"     Outpatient Encounter Prescriptions as of 12/27/2017   Medication Sig Dispense Refill   • nitroglycerin (NITROSTAT) 0.4 MG SL Tab Place 1 Tab under tongue as needed for Chest Pain. 25 Tab 11   • hydrochlorothiazide (HYDRODIURIL) 12.5 MG tablet Take 1 Tab by mouth every day. 90 Tab 3   • atorvastatin (LIPITOR) 20 MG Tab Take 1 Tab by mouth every day. 90 Tab 3   • B Complex Vitamins (B COMPLEX PO) Take  by mouth.     • valsartan (DIOVAN) 160 MG Tab Take 1 Tab by mouth every day. 90 Tab 3   • potassium chloride ER (KLOR-CON) 10 MEQ tablet Take 1 Tab by mouth every day. 90 Tab 3   • thiamine (THIAMINE) 50 MG Tab Take 50 mg by mouth every day.     • Magnesium 400 MG CAPS Take  by mouth every day.     • aspirin EC (ECOTRIN) 81 MG TBEC Take 81 mg by mouth every day.     • glucosamine Sulfate 500 MG CAPS Take 500 mg by mouth 2 times a day, with meals.       • VITAMIN D, CHOLECALCIFEROL, PO Take  by mouth.         No facility-administered encounter medications on file as of 12/27/2017.      Review of Systems   Constitutional: Negative for chills and fever.   HENT: Negative for congestion.    Respiratory: Negative for cough and shortness of breath.    Cardiovascular: Positive for chest pain. Negative for palpitations, orthopnea, leg swelling and PND.   Gastrointestinal: Negative for abdominal pain and nausea.   Musculoskeletal: Positive for joint pain. Negative for myalgias.   Skin: Negative for rash.   Neurological: Negative for dizziness, loss of consciousness and headaches. " "  Endo/Heme/Allergies: Does not bruise/bleed easily.        Objective:   BP (!) 166/78   Pulse 78   Ht 1.6 m (5' 3\")   Wt 68.9 kg (152 lb)   SpO2 95%   BMI 26.93 kg/m²      Physical Exam   Constitutional: She is oriented to person, place, and time. She appears well-developed and well-nourished.   HENT:   Head: Normocephalic and atraumatic.   Eyes: Conjunctivae and EOM are normal. No scleral icterus.   Neck: Neck supple. No JVD present. No thyromegaly present.   Cardiovascular: Normal rate, regular rhythm and normal heart sounds.  Exam reveals no gallop and no friction rub.    No murmur heard.  Pulmonary/Chest: Effort normal and breath sounds normal. No respiratory distress. She has no wheezes. She has no rales. She exhibits no tenderness.   Pacemaker generator unremarkable   Abdominal: Soft. Bowel sounds are normal. She exhibits no distension and no mass. There is no tenderness.   Neurological: She is alert and oriented to person, place, and time. Coordination normal.   Skin: Skin is warm and dry. No rash noted. No pallor.   Psychiatric: She has a normal mood and affect. Her behavior is normal. Judgment and thought content normal.       Assessment:     1. Chest pressure  RI EPIPHANY EKG (Clinic Performed)    NM-CARDIAC STRESS TEST   2. Essential hypertension     3. Cardiac pacemaker in situ     4. Paroxysmal atrial fibrillation (CMS-[facility])         Medical Decision Making:  Today's Assessment / Status / Plan:   With respect to chest pressure, chemical MPI ordered  Nitroglycerin sublingually ordered and discussed.  Follow-up blood pressures  No change in meds  Return in one month      Isaac Giles M.D.  [address] [address]  VIA Facsimile: [phone]                 "

## 2017-12-27 NOTE — PROGRESS NOTES
Subjective:   Carissa Marin is a 87 y.o. female who presents today Concerned about an episode that awakened her from sleep at approximately  with retrosternal pressure across her chest that lasted for 5 minutes and then spontaneously resolved with no recurrence at all since then.  She needs a limited lifestyle. She has done some walking with [employer] shopping and has had no further chest discomfort palpitations or effort dyspnea.  She reports a slight bit of pedal edema and ankle edema late in the day.    Today's EKG demonstrates a regular rhythm with a right bundle branch block that is not paced. Right bundle branch block appears to be new compared to EKG in 2015      Past Medical History:   Diagnosis Date   • Abdominal pain, other specified site     pelvic   • AF (atrial fibrillation) (CMS-[facility]) 2012   • ASTHMA    • Atrial fibrillation (CMS-[facility])    • Colon polyps 2012   • Herpes simplex without mention of complication    • Hyperglycemia 2012   • Hyperlipidemia    • Hypertension    • Hypopotassemia    • Lump or mass in breast    • Need for prophylactic hormone replacement therapy (postmenopausal)    • Nonspecific abnormal results of thyroid function study    • Osteoarthrosis, unspecified whether generalized or localized, lower leg    • Other and unspecified hyperlipidemia 2012   • Pain in joint, shoulder region     right shoulder   • Palpitations 10/1/2012   • Pneumonia, organism unspecified(486)    • Pulmonary hypertension    • Routine general medical examination at a health care facility    • Sinus bradycardia 10/1/2012   • Unspecified spontaneous  without mention of complication      Past Surgical History:   Procedure Laterality Date   • ABDOMINAL HYSTERECTOMY TOTAL      tahbso in [place] , not cancerous   • DILATION AND CURETTAGE     • PACEMAKER INSERTION     • TONSILLECTOMY       Family History   Problem Relation Age of Onset   • Heart Disease Father      CAD  "    History   Smoking Status   • Never Smoker   Smokeless Tobacco   • Never Used     Allergies   Allergen Reactions   • Niacin      Bad hot rush, \"on fire\"     Outpatient Encounter Prescriptions as of 12/27/2017   Medication Sig Dispense Refill   • nitroglycerin (NITROSTAT) 0.4 MG SL Tab Place 1 Tab under tongue as needed for Chest Pain. 25 Tab 11   • hydrochlorothiazide (HYDRODIURIL) 12.5 MG tablet Take 1 Tab by mouth every day. 90 Tab 3   • atorvastatin (LIPITOR) 20 MG Tab Take 1 Tab by mouth every day. 90 Tab 3   • B Complex Vitamins (B COMPLEX PO) Take  by mouth.     • valsartan (DIOVAN) 160 MG Tab Take 1 Tab by mouth every day. 90 Tab 3   • potassium chloride ER (KLOR-CON) 10 MEQ tablet Take 1 Tab by mouth every day. 90 Tab 3   • thiamine (THIAMINE) 50 MG Tab Take 50 mg by mouth every day.     • Magnesium 400 MG CAPS Take  by mouth every day.     • aspirin EC (ECOTRIN) 81 MG TBEC Take 81 mg by mouth every day.     • glucosamine Sulfate 500 MG CAPS Take 500 mg by mouth 2 times a day, with meals.       • VITAMIN D, CHOLECALCIFEROL, PO Take  by mouth.         No facility-administered encounter medications on file as of 12/27/2017.      Review of Systems   Constitutional: Negative for chills and fever.   HENT: Negative for congestion.    Respiratory: Negative for cough and shortness of breath.    Cardiovascular: Positive for chest pain. Negative for palpitations, orthopnea, leg swelling and PND.   Gastrointestinal: Negative for abdominal pain and nausea.   Musculoskeletal: Positive for joint pain. Negative for myalgias.   Skin: Negative for rash.   Neurological: Negative for dizziness, loss of consciousness and headaches.   Endo/Heme/Allergies: Does not bruise/bleed easily.        Objective:   BP (!) 166/78   Pulse 78   Ht 1.6 m (5' 3\")   Wt 68.9 kg (152 lb)   SpO2 95%   BMI 26.93 kg/m²     Physical Exam   Constitutional: She is oriented to person, place, and time. She appears well-developed and " well-nourished.   HENT:   Head: Normocephalic and atraumatic.   Eyes: Conjunctivae and EOM are normal. No scleral icterus.   Neck: Neck supple. No JVD present. No thyromegaly present.   Cardiovascular: Normal rate, regular rhythm and normal heart sounds.  Exam reveals no gallop and no friction rub.    No murmur heard.  Pulmonary/Chest: Effort normal and breath sounds normal. No respiratory distress. She has no wheezes. She has no rales. She exhibits no tenderness.   Pacemaker generator unremarkable   Abdominal: Soft. Bowel sounds are normal. She exhibits no distension and no mass. There is no tenderness.   Neurological: She is alert and oriented to person, place, and time. Coordination normal.   Skin: Skin is warm and dry. No rash noted. No pallor.   Psychiatric: She has a normal mood and affect. Her behavior is normal. Judgment and thought content normal.       Assessment:     1. Chest pressure  [facility] EPIPHANY EKG (Clinic Performed)    NM-CARDIAC STRESS TEST   2. Essential hypertension     3. Cardiac pacemaker in situ     4. Paroxysmal atrial fibrillation (CMS-[facility])         Medical Decision Making:  Today's Assessment / Status / Plan:   With respect to chest pressure, chemical MPI ordered  Nitroglycerin sublingually ordered and discussed.  Follow-up blood pressures  No change in meds  Return in one month

## 2017-12-27 NOTE — TELEPHONE ENCOUNTER
Appointment made with Dr. Mendez for today.  I did not order HCTZ QOD (I ordered QD by mistake).   Will let Dr. Mendez decided on dose today and will change it at that time.

## 2017-12-27 NOTE — TELEPHONE ENCOUNTER
Per Dr. Mendez:  After visit today,  Dr. Mendez advised keeping patient on HCTZ 12.5 daily.  Patient was here went I went over everything with her and will comply.

## 2017-12-28 LAB — EKG IMPRESSION: NORMAL

## 2018-01-15 ENCOUNTER — TELEPHONE (OUTPATIENT)
Dept: CARDIOLOGY | Facility: MEDICAL CENTER | Age: 83
End: 2018-01-15

## 2018-01-15 ENCOUNTER — HOSPITAL ENCOUNTER (OUTPATIENT)
Dept: RADIOLOGY | Facility: MEDICAL CENTER | Age: 83
End: 2018-01-15
Attending: INTERNAL MEDICINE
Payer: MEDICARE

## 2018-01-15 DIAGNOSIS — R07.89 CHEST PRESSURE: ICD-10-CM

## 2018-01-15 PROCEDURE — 700111 HCHG RX REV CODE 636 W/ 250 OVERRIDE (IP)

## 2018-01-15 PROCEDURE — A9502 TC99M TETROFOSMIN: HCPCS

## 2018-01-15 RX ORDER — REGADENOSON 0.08 MG/ML
INJECTION, SOLUTION INTRAVENOUS
Status: COMPLETED
Start: 2018-01-15 | End: 2018-01-15

## 2018-01-15 RX ADMIN — REGADENOSON 0.4 MG: 0.08 INJECTION, SOLUTION INTRAVENOUS at 10:49

## 2018-01-15 NOTE — PROGRESS NOTES
Patient educated re: Pharmacological NM MPI. Nursing goals identified: knowledge deficit, potential for anxiety r/t stress test, potential for compromised cardiac output. Care plan includes educating patient, reassurance and access to ACLS cart/team. Labs and ECG reviewed. Pt denies CP. No caffeine and NPO confirmed. After resting images attained, patient prepped for pharmacological stress. After injection of Lexiscan, patient reported these symptoms: Heavy legs, SOB, weird feeling, headache. Caffeinated beverage and light snack provided. Symptoms resolved. Patient tolerated procedure well.

## 2018-01-16 NOTE — TELEPHONE ENCOUNTER
"Pt was notified. No more episodes but said stress test was \"tough\". She has FV 1/31 with you, & also FV with PMC scheduled 5/29 w/ Dr. Puckett & Alessandra on 5/29. Does she need to keep appt. with you?  "

## 2018-01-16 NOTE — TELEPHONE ENCOUNTER
Pt. notified & 1/31 appt. cx.        Adriel Mendez M.D.   You 43 minutes ago (8:47 AM)      If she is asymptomatic, no need to see me on January 31 (Routing comment)

## 2018-01-16 NOTE — TELEPHONE ENCOUNTER
----- Message from Adriel Mendez M.D. sent at 1/15/2018  4:07 PM PST -----  . Stress test entirely normal. This is good news.  Any further episodes?

## 2018-03-26 ENCOUNTER — TELEPHONE (OUTPATIENT)
Dept: CARDIOLOGY | Facility: MEDICAL CENTER | Age: 83
End: 2018-03-26

## 2018-03-26 NOTE — TELEPHONE ENCOUNTER
Former Dr. Hawley pt who has seen both JES Prescott & Dr. Mendez. Has next PMC & FV with marty Paulino & Dr. Kenya Puckett on 5/29. Asks about lab work to get potassium checked. I mailed her the CMP, LP, & CBC orders Rupali Silver placed at last visit to be done prior.

## 2018-03-26 NOTE — TELEPHONE ENCOUNTER
----- Message from Arron Myers sent at 3/26/2018  2:16 PM PDT -----  Regarding: Question about medication and upcoming appt   Contact: [phone]  SALMA/Shantel    Pt has question about upcoming appt. Also has question about medication(did not specify which medication). She can be reached at [phone].

## 2018-05-17 ENCOUNTER — TELEPHONE (OUTPATIENT)
Dept: CARDIOLOGY | Facility: MEDICAL CENTER | Age: 83
End: 2018-05-17

## 2018-05-17 NOTE — TELEPHONE ENCOUNTER
ESTIMATED GFR   Order: [id number]   Status:  Final result   Visible to patient:  No (Not Released)   Notes recorded by MEENU Vaughn on 5/14/2018 at 11:50 AM PDT  CMP good with low GFR and Cr mildly elevated which is chronic for her, K good-ok to stop pot supplement if wanted. cholesterol great!     Called pt and no answer. LM to please call office back for recommendations.    Patient returning call   Received: Today   Message Contents   MARTINA Curran/Jorge     Patient is returning your call and can be reached at [phone].       5/25: s/w pt and discussed lab results and recommendations regarding stopping potassium. Pt asked about stopping her diuretic hctz as well. Advised pt to continue hctz. Pt states understanding.

## 2018-06-19 ENCOUNTER — OFFICE VISIT (OUTPATIENT)
Dept: CARDIOLOGY | Facility: MEDICAL CENTER | Age: 83
End: 2018-06-19
Payer: MEDICARE

## 2018-06-19 ENCOUNTER — NON-PROVIDER VISIT (OUTPATIENT)
Dept: CARDIOLOGY | Facility: MEDICAL CENTER | Age: 83
End: 2018-06-19
Payer: MEDICARE

## 2018-06-19 VITALS
SYSTOLIC BLOOD PRESSURE: 118 MMHG | HEART RATE: 80 BPM | HEIGHT: 63 IN | OXYGEN SATURATION: 98 % | BODY MASS INDEX: 25.87 KG/M2 | DIASTOLIC BLOOD PRESSURE: 60 MMHG | WEIGHT: 146 LBS

## 2018-06-19 VITALS
SYSTOLIC BLOOD PRESSURE: 118 MMHG | BODY MASS INDEX: 25.87 KG/M2 | DIASTOLIC BLOOD PRESSURE: 60 MMHG | OXYGEN SATURATION: 98 % | HEART RATE: 80 BPM | WEIGHT: 146 LBS | HEIGHT: 63 IN

## 2018-06-19 DIAGNOSIS — Z95.0 CARDIAC PACEMAKER IN SITU: ICD-10-CM

## 2018-06-19 DIAGNOSIS — I48.0 PAROXYSMAL ATRIAL FIBRILLATION (HCC): ICD-10-CM

## 2018-06-19 DIAGNOSIS — I49.5 SICK SINUS SYNDROME (HCC): ICD-10-CM

## 2018-06-19 PROCEDURE — 99214 OFFICE O/P EST MOD 30 MIN: CPT | Performed by: INTERNAL MEDICINE

## 2018-06-19 PROCEDURE — 93288 INTERROG EVL PM/LDLS PM IP: CPT | Performed by: NURSE PRACTITIONER

## 2018-06-19 ASSESSMENT — ENCOUNTER SYMPTOMS
NAUSEA: 0
HEADACHES: 0
ORTHOPNEA: 0
COUGH: 0
BRUISES/BLEEDS EASILY: 0
MYALGIAS: 0
ABDOMINAL PAIN: 0
FEVER: 0
LOSS OF CONSCIOUSNESS: 0
CHILLS: 0
SHORTNESS OF BREATH: 0
PALPITATIONS: 0
PND: 0
DIZZINESS: 0

## 2018-06-19 NOTE — PROGRESS NOTES
Chief Complaint   Patient presents with   • Atrial Fibrillation     Follow up       Subjective:   Carissa Marin is a 88 y.o. female who presents today   In follow-up for pacemaker and paroxysmal atrial fibrillation.  Clinically doing well with no complaints of palpitations shortness of breath or chest discomfort.  Trace ankle edema intermittently at the end of the day after having stopped hydrochlorothiazide and potassium.    Pacemaker interrogation does demonstrate occasional episodes of atrial fibrillation longest being 8 hours on 2018.  She has had no further chest discomfort.  The myocardial perfusion test done several months ago was entirely normal.      Past Medical History:   Diagnosis Date   • Abdominal pain, other specified site     pelvic   • ASTHMA    • Atrial fibrillation (HCC)    • Colon polyps    • Herpes simplex without mention of complication    • Hyperglycemia    • Hyperlipidemia    • Hypertension    • Lump or mass in breast    • Need for prophylactic hormone replacement therapy (postmenopausal)    • Nonspecific abnormal results of thyroid function study    • Osteoarthrosis, unspecified whether generalized or localized, lower leg    • Pain in joint, shoulder region     right shoulder   • Palpitations    • Pneumonia, organism unspecified(486)    • Pulmonary hypertension (HCC)    • Routine general medical examination at a health care facility    • Sinus bradycardia    • Unspecified spontaneous  without mention of complication      Past Surgical History:   Procedure Laterality Date   • PACEMAKER INSERTION Left 2012    Medtronic Versa VEDR01 implanted by Dr. Lewis.   • ABDOMINAL HYSTERECTOMY TOTAL      tahbso in [place] , not cancerous   • DILATION AND CURETTAGE     • TONSILLECTOMY       Family History   Problem Relation Age of Onset   • Heart Disease Father      CAD     Social History     Social History   • Marital status:      Spouse name: N/A   •  "Number of children: N/A   • Years of education: N/A     Occupational History   • Not on file.     Social History Main Topics   • Smoking status: Never Smoker   • Smokeless tobacco: Never Used   • Alcohol use Yes   • Drug use: Unknown   • Sexual activity: Not on file     Other Topics Concern   • Not on file     Social History Narrative   • No narrative on file     Allergies   Allergen Reactions   • Niacin      Bad hot rush, \"on fire\"     Outpatient Encounter Prescriptions as of 6/19/2018   Medication Sig Dispense Refill   • nitroglycerin (NITROSTAT) 0.4 MG SL Tab Place 1 Tab under tongue as needed for Chest Pain. 25 Tab 11   • [DISCONTINUED] hydrochlorothiazide (HYDRODIURIL) 12.5 MG tablet Take 1 Tab by mouth every day. (Patient not taking: Reported on 6/19/2018) 90 Tab 3   • atorvastatin (LIPITOR) 20 MG Tab Take 1 Tab by mouth every day. 90 Tab 3   • B Complex Vitamins (B COMPLEX PO) Take  by mouth.     • valsartan (DIOVAN) 160 MG Tab Take 1 Tab by mouth every day. 90 Tab 3   • [DISCONTINUED] potassium chloride ER (KLOR-CON) 10 MEQ tablet Take 1 Tab by mouth every day. (Patient not taking: Reported on 6/19/2018) 90 Tab 3   • [DISCONTINUED] thiamine (THIAMINE) 50 MG Tab Take 50 mg by mouth every day.     • Magnesium 400 MG CAPS Take  by mouth every day.     • aspirin EC (ECOTRIN) 81 MG TBEC Take 81 mg by mouth every day.     • glucosamine Sulfate 500 MG CAPS Take 500 mg by mouth 2 times a day, with meals.       • VITAMIN D, CHOLECALCIFEROL, PO Take  by mouth.       No facility-administered encounter medications on file as of 6/19/2018.      Review of Systems   Constitutional: Negative for chills and fever.   HENT: Negative for congestion.    Respiratory: Negative for cough and shortness of breath.    Cardiovascular: Negative for chest pain, palpitations, orthopnea, leg swelling and PND.   Gastrointestinal: Negative for abdominal pain and nausea.   Musculoskeletal: Positive for joint pain. Negative for myalgias. " "  Skin: Negative for rash.   Neurological: Negative for dizziness, loss of consciousness and headaches.   Endo/Heme/Allergies: Does not bruise/bleed easily.        Objective:   /60   Pulse 80   Ht 1.6 m (5' 3\")   Wt 66.2 kg (146 lb)   SpO2 98%   BMI 25.86 kg/m²     Physical Exam   Constitutional: She is oriented to person, place, and time. She appears well-developed and well-nourished.   A very pleasant lady accompanied by her daughter   Neck: No JVD present.   Cardiovascular: Normal rate and regular rhythm.  Exam reveals no gallop and no friction rub.    No murmur heard.  Pulmonary/Chest: Effort normal and breath sounds normal.   Pacemaker generator not tender   Abdominal: Soft. There is no tenderness.   Musculoskeletal:   Trace ankle edema today   Neurological: She is alert and oriented to person, place, and time.   Skin: Skin is warm and dry.       Assessment:     1. Cardiac pacemaker in situ     2. Paroxysmal atrial fibrillation (HCC)         Medical Decision Making:  Today's Assessment / Status / Plan:   Greater than 25 minutes was spent discussing the benefits alternatives and risk of oral anticoagulation for stroke prevention in the setting of paroxysmal atrial fibrillation.    She is amenable to starting 1 of these drugs with printing on cost.  She will check on the cost of the direct oral anticoagulants.  Based on the fact she is over the age of 80 and just a little over 60 kg of body weight I would favor low-dose Xarelto such as 15 mg per day, or Eliquis 2.5 mg twice daily.  We discussed stopping aspirin should she switch to 1 of the oral anticoagulants.  Otherwise return for pacemaker interrogation in 6 months.  "

## 2018-06-19 NOTE — LETTER
[facility]   [address]   [address]   [place] [address]  Phone: [phone]  Fax: [phone]              Carissa Marin  1930    Encounter Date: 2018    Adriel Mendez M.D.          PROGRESS NOTE:  Chief Complaint   Patient presents with   • Atrial Fibrillation     Follow up       Subjective:   Carissa Marin is a 88 y.o. female who presents today   In follow-up for pacemaker and paroxysmal atrial fibrillation.  Clinically doing well with no complaints of palpitations shortness of breath or chest discomfort.  Trace ankle edema intermittently at the end of the day after having stopped hydrochlorothiazide and potassium.    Pacemaker interrogation does demonstrate occasional episodes of atrial fibrillation longest being 8 hours on 2018.  She has had no further chest discomfort.  The myocardial perfusion test done several months ago was entirely normal.      Past Medical History:   Diagnosis Date   • Abdominal pain, other specified site     pelvic   • ASTHMA    • Atrial fibrillation (HCC)    • Colon polyps    • Herpes simplex without mention of complication    • Hyperglycemia    • Hyperlipidemia    • Hypertension    • Lump or mass in breast    • Need for prophylactic hormone replacement therapy (postmenopausal)    • Nonspecific abnormal results of thyroid function study    • Osteoarthrosis, unspecified whether generalized or localized, lower leg    • Pain in joint, shoulder region     right shoulder   • Palpitations    • Pneumonia, organism unspecified(486)    • Pulmonary hypertension (HCC)    • Routine general medical examination at a health care facility    • Sinus bradycardia    • Unspecified spontaneous  without mention of complication      Past Surgical History:   Procedure Laterality Date   • PACEMAKER INSERTION Left 2012    Medtronic Versa VEDR01 implanted by Dr. Lewis.   •  "ABDOMINAL HYSTERECTOMY TOTAL  2002    tahbso in [place] , not cancerous   • DILATION AND CURETTAGE     • TONSILLECTOMY       Family History   Problem Relation Age of Onset   • Heart Disease Father      CAD     Social History     Social History   • Marital status:      Spouse name: N/A   • Number of children: N/A   • Years of education: N/A     Occupational History   • Not on file.     Social History Main Topics   • Smoking status: Never Smoker   • Smokeless tobacco: Never Used   • Alcohol use Yes   • Drug use: Unknown   • Sexual activity: Not on file     Other Topics Concern   • Not on file     Social History Narrative   • No narrative on file     Allergies   Allergen Reactions   • Niacin      Bad hot rush, \"on fire\"     Outpatient Encounter Prescriptions as of 6/19/2018   Medication Sig Dispense Refill   • nitroglycerin (NITROSTAT) 0.4 MG SL Tab Place 1 Tab under tongue as needed for Chest Pain. 25 Tab 11   • [DISCONTINUED] hydrochlorothiazide (HYDRODIURIL) 12.5 MG tablet Take 1 Tab by mouth every day. (Patient not taking: Reported on 6/19/2018) 90 Tab 3   • atorvastatin (LIPITOR) 20 MG Tab Take 1 Tab by mouth every day. 90 Tab 3   • B Complex Vitamins (B COMPLEX PO) Take  by mouth.     • valsartan (DIOVAN) 160 MG Tab Take 1 Tab by mouth every day. 90 Tab 3   • [DISCONTINUED] potassium chloride ER (KLOR-CON) 10 MEQ tablet Take 1 Tab by mouth every day. (Patient not taking: Reported on 6/19/2018) 90 Tab 3   • [DISCONTINUED] thiamine (THIAMINE) 50 MG Tab Take 50 mg by mouth every day.     • Magnesium 400 MG CAPS Take  by mouth every day.     • aspirin EC (ECOTRIN) 81 MG TBEC Take 81 mg by mouth every day.     • glucosamine Sulfate 500 MG CAPS Take 500 mg by mouth 2 times a day, with meals.       • VITAMIN D, CHOLECALCIFEROL, PO Take  by mouth.       No facility-administered encounter medications on file as of 6/19/2018.      Review of Systems   Constitutional: Negative for chills and fever.   HENT: Negative for " "congestion.    Respiratory: Negative for cough and shortness of breath.    Cardiovascular: Negative for chest pain, palpitations, orthopnea, leg swelling and PND.   Gastrointestinal: Negative for abdominal pain and nausea.   Musculoskeletal: Positive for joint pain. Negative for myalgias.   Skin: Negative for rash.   Neurological: Negative for dizziness, loss of consciousness and headaches.   Endo/Heme/Allergies: Does not bruise/bleed easily.        Objective:   /60   Pulse 80   Ht 1.6 m (5' 3\")   Wt 66.2 kg (146 lb)   SpO2 98%   BMI 25.86 kg/m²      Physical Exam   Constitutional: She is oriented to person, place, and time. She appears well-developed and well-nourished.   A very pleasant lady accompanied by her daughter   Neck: No JVD present.   Cardiovascular: Normal rate and regular rhythm.  Exam reveals no gallop and no friction rub.    No murmur heard.  Pulmonary/Chest: Effort normal and breath sounds normal.   Pacemaker generator not tender   Abdominal: Soft. There is no tenderness.   Musculoskeletal:   Trace ankle edema today   Neurological: She is alert and oriented to person, place, and time.   Skin: Skin is warm and dry.       Assessment:     1. Cardiac pacemaker in situ     2. Paroxysmal atrial fibrillation (HCC)         Medical Decision Making:  Today's Assessment / Status / Plan:   Greater than 25 minutes was spent discussing the benefits alternatives and risk of oral anticoagulation for stroke prevention in the setting of paroxysmal atrial fibrillation.    She is amenable to starting 1 of these drugs with printing on cost.  She will check on the cost of the direct oral anticoagulants.  Based on the fact she is over the age of 80 and just a little over 60 kg of body weight I would favor low-dose Xarelto such as 15 mg per day, or Eliquis 2.5 mg twice daily.  We discussed stopping aspirin should she switch to 1 of the oral anticoagulants.  Otherwise return for pacemaker interrogation in 6 " months.      Isaac Giles M.D.  [address] [facility] [address]  VIA Facsimile: [phone]

## 2018-06-21 ENCOUNTER — TELEPHONE (OUTPATIENT)
Dept: CARDIOLOGY | Facility: MEDICAL CENTER | Age: 83
End: 2018-06-21

## 2018-06-21 DIAGNOSIS — I10 ESSENTIAL HYPERTENSION: Chronic | ICD-10-CM

## 2018-06-21 RX ORDER — VALSARTAN 160 MG/1
160 TABLET ORAL DAILY
Qty: 7 TAB | Refills: 0 | Status: SHIPPED | OUTPATIENT
Start: 2018-06-21 | End: 2019-02-01

## 2018-06-21 RX ORDER — VALSARTAN 160 MG/1
160 TABLET ORAL DAILY
Qty: 90 TAB | Refills: 3 | Status: SHIPPED | OUTPATIENT
Start: 2018-06-21 | End: 2018-06-21 | Stop reason: SDUPTHER

## 2018-08-09 ENCOUNTER — TELEPHONE (OUTPATIENT)
Dept: CARDIOLOGY | Facility: MEDICAL CENTER | Age: 83
End: 2018-08-09

## 2018-08-09 NOTE — TELEPHONE ENCOUNTER
----- Message from Lillian Young sent at 8/9/2018 10:50 AM PDT -----  Regarding: patient needs dental clearance  SALMA/Edilia      Patient needs dental clearance, and wants to know if she needs another pacemaker check first. She can be reached at [phone].

## 2018-08-09 NOTE — TELEPHONE ENCOUNTER
I called Carissa.  She has heard from Dr. Mcfarlane (or the office) that she needs to have a pacer check before she has her root canal.  Per Alessandra Paulino's last note, the next check would not need to be done until December of this year.  I let Carissa know that I will contact Dr. Mcfarlane's office to inquire if she really needs to wait until that check is done.

## 2018-08-16 NOTE — TELEPHONE ENCOUNTER
I called Carissa back to clarify message.  She says that actually Dr. Giles needs to know if she can be off of Xarelto for the root canal.    Dr. Mcfarlane's phone #'s per patient:    [phone] (Main office)  [phone] (Part time-urgent care- office)    I called and Mary is going to speak with Dr. Mcfarlane's and call me back.

## 2018-08-23 NOTE — TELEPHONE ENCOUNTER
Dr. Mcfarlane called back.  He and the patient want to wait until after the next pacer check/appt before proceeding with the root canal.  Dr. Mcfarlane is concerned about taking her off of Xarelto/ASA temporarily because a stroke would be devastating to her. If she does not have any atrial fib on next pacer check, he would be more inclined to proceed with the root canal.    Patient has an appointment for pacer check and Dr. LIZZETH Puckett in December.

## 2019-02-01 ENCOUNTER — OFFICE VISIT (OUTPATIENT)
Dept: CARDIOLOGY | Facility: MEDICAL CENTER | Age: 84
End: 2019-02-01
Payer: MEDICARE

## 2019-02-01 ENCOUNTER — NON-PROVIDER VISIT (OUTPATIENT)
Dept: CARDIOLOGY | Facility: MEDICAL CENTER | Age: 84
End: 2019-02-01
Payer: MEDICARE

## 2019-02-01 VITALS
WEIGHT: 133 LBS | DIASTOLIC BLOOD PRESSURE: 78 MMHG | HEIGHT: 63 IN | BODY MASS INDEX: 23.57 KG/M2 | OXYGEN SATURATION: 99 % | SYSTOLIC BLOOD PRESSURE: 134 MMHG | HEART RATE: 64 BPM

## 2019-02-01 DIAGNOSIS — Z95.0 CARDIAC PACEMAKER IN SITU: ICD-10-CM

## 2019-02-01 DIAGNOSIS — I49.5 SICK SINUS SYNDROME (HCC): ICD-10-CM

## 2019-02-01 DIAGNOSIS — E78.2 MIXED HYPERLIPIDEMIA: ICD-10-CM

## 2019-02-01 DIAGNOSIS — I10 HTN (HYPERTENSION), MALIGNANT: ICD-10-CM

## 2019-02-01 DIAGNOSIS — I48.0 PAF (PAROXYSMAL ATRIAL FIBRILLATION) (HCC): ICD-10-CM

## 2019-02-01 DIAGNOSIS — Z79.899 HIGH RISK MEDICATION USE: ICD-10-CM

## 2019-02-01 PROCEDURE — 99214 OFFICE O/P EST MOD 30 MIN: CPT | Performed by: INTERNAL MEDICINE

## 2019-02-01 PROCEDURE — 93280 PM DEVICE PROGR EVAL DUAL: CPT | Performed by: INTERNAL MEDICINE

## 2019-02-01 RX ORDER — LATANOPROST 50 UG/ML
SOLUTION/ DROPS OPHTHALMIC
COMMUNITY
Start: 2018-12-05 | End: 2019-02-01

## 2019-02-01 ASSESSMENT — ENCOUNTER SYMPTOMS
BLURRED VISION: 0
CHILLS: 0
HALLUCINATIONS: 0
ABDOMINAL PAIN: 0
WEIGHT LOSS: 0
VOMITING: 0
MYALGIAS: 0
CLAUDICATION: 0
LOSS OF CONSCIOUSNESS: 0
DOUBLE VISION: 0
NAUSEA: 0
EYE DISCHARGE: 0
PND: 0
SENSORY CHANGE: 0
COUGH: 0
FALLS: 0
DIZZINESS: 1
BLOOD IN STOOL: 0
BRUISES/BLEEDS EASILY: 0
FEVER: 0
ORTHOPNEA: 0
HEADACHES: 0
PALPITATIONS: 0
SPEECH CHANGE: 0
SHORTNESS OF BREATH: 0
DEPRESSION: 0
EYE PAIN: 0

## 2019-02-01 NOTE — PROGRESS NOTES
"Chief Complaint   Patient presents with   • Atrial Fibrillation     F/V: 6 MO       Subjective:   Carissa Marin is a 89 y.o. female who presents today for PAF, SSS s/p PPM, HTN.    In the interim, patient has been doing ok without change.    Past Medical History:   Diagnosis Date   • Abdominal pain, other specified site     pelvic   • ASTHMA    • Atrial fibrillation (HCC)    • Colon polyps    • Herpes simplex without mention of complication    • Hyperglycemia    • Hyperlipidemia    • Hypertension    • Lump or mass in breast    • Need for prophylactic hormone replacement therapy (postmenopausal)    • Nonspecific abnormal results of thyroid function study    • Osteoarthrosis, unspecified whether generalized or localized, lower leg    • Pain in joint, shoulder region     right shoulder   • Palpitations    • Pneumonia, organism unspecified(486)    • Pulmonary hypertension (HCC)    • Routine general medical examination at a health care facility    • Sinus bradycardia    • Unspecified spontaneous  without mention of complication      Past Surgical History:   Procedure Laterality Date   • PACEMAKER INSERTION Left 2012    Medtronic Versa [id number] implanted by Dr. Lewis.   • ABDOMINAL HYSTERECTOMY TOTAL      tahbso in [place] , not cancerous   • DILATION AND CURETTAGE     • TONSILLECTOMY       Family History   Problem Relation Age of Onset   • Heart Disease Father         CAD     Social History     Social History   • Marital status:      Spouse name: N/A   • Number of children: N/A   • Years of education: N/A     Occupational History   • Not on file.     Social History Main Topics   • Smoking status: Never Smoker   • Smokeless tobacco: Never Used   • Alcohol use Yes   • Drug use: Unknown   • Sexual activity: Not on file     Other Topics Concern   • Not on file     Social History Narrative   • No narrative on file     Allergies   Allergen Reactions   • Niacin      Bad hot rush, \"on fire\" "     Outpatient Encounter Prescriptions as of 2/1/2019   Medication Sig Dispense Refill   • rivaroxaban (XARELTO) 15 MG Tab tablet Take 1 Tab by mouth with dinner. 90 Tab 3   • VITAMIN D, CHOLECALCIFEROL, PO Take  by mouth.     • [DISCONTINUED] latanoprost (XALATAN) 0.005 % Solution      • [DISCONTINUED] valsartan (DIOVAN) 160 MG Tab Take 1 Tab by mouth every day. (Patient not taking: Reported on 2/1/2019) 7 Tab 0   • nitroglycerin (NITROSTAT) 0.4 MG SL Tab Place 1 Tab under tongue as needed for Chest Pain. 25 Tab 11   • [DISCONTINUED] atorvastatin (LIPITOR) 20 MG Tab Take 1 Tab by mouth every day. (Patient not taking: Reported on 2/1/2019) 90 Tab 3   • [DISCONTINUED] B Complex Vitamins (B COMPLEX PO) Take  by mouth.     • [DISCONTINUED] Magnesium 400 MG CAPS Take  by mouth every day.     • [DISCONTINUED] aspirin EC (ECOTRIN) 81 MG TBEC Take 81 mg by mouth every day.     • [DISCONTINUED] glucosamine Sulfate 500 MG CAPS Take 500 mg by mouth 2 times a day, with meals.         No facility-administered encounter medications on file as of 2/1/2019.      Review of Systems   Constitutional: Negative for chills, fever, malaise/fatigue and weight loss.   HENT: Negative for ear discharge, ear pain, hearing loss and nosebleeds.    Eyes: Negative for blurred vision, double vision, pain and discharge.   Respiratory: Negative for cough and shortness of breath.    Cardiovascular: Negative for chest pain, palpitations, orthopnea, claudication, leg swelling and PND.   Gastrointestinal: Negative for abdominal pain, blood in stool, melena, nausea and vomiting.   Genitourinary: Negative for dysuria and hematuria.   Musculoskeletal: Negative for falls, joint pain and myalgias.   Skin: Negative for itching and rash.   Neurological: Positive for dizziness. Negative for sensory change, speech change, loss of consciousness and headaches.   Endo/Heme/Allergies: Negative for environmental allergies. Does not bruise/bleed easily.  "  Psychiatric/Behavioral: Negative for depression, hallucinations and suicidal ideas.        Objective:   /78 (BP Location: Right arm, Patient Position: Sitting, BP Cuff Size: Adult)   Pulse 64   Ht 1.6 m (5' 3\")   Wt 60.3 kg (133 lb)   SpO2 99%   BMI 23.56 kg/m²     Physical Exam   Constitutional: She is oriented to person, place, and time. No distress.   HENT:   Head: Normocephalic and atraumatic.   Right Ear: External ear normal.   Left Ear: External ear normal.   Eyes: Right eye exhibits no discharge. Left eye exhibits no discharge.   Neck: No JVD present. No thyromegaly present.   Cardiovascular: Normal rate, regular rhythm, normal heart sounds and intact distal pulses.  Exam reveals no gallop and no friction rub.    No murmur heard.  Pulmonary/Chest: Breath sounds normal. No respiratory distress.   Abdominal: Bowel sounds are normal. She exhibits no distension. There is no tenderness.   Musculoskeletal: She exhibits no edema or tenderness.   Neurological: She is alert and oriented to person, place, and time. No cranial nerve deficit.   Skin: Skin is warm and dry. She is not diaphoretic.   Psychiatric: She has a normal mood and affect. Her behavior is normal.   Nursing note and vitals reviewed.      Assessment:     1. PAF (paroxysmal atrial fibrillation) (HCC)     2. Cardiac pacemaker in situ     3. Sick sinus syndrome (HCC)     4. Mixed hyperlipidemia     5. HTN (hypertension), malignant     6. High risk medication use         Medical Decision Making:  Today's Assessment / Status / Plan:   Blood pressure is well controlled.  Continue Xarelto 15 mg po daily.  "

## 2019-02-01 NOTE — LETTER
[facility]   [address], [address]  [place] [address]  Phone: [phone]  Fax: [phone]              Carissa Marin  1930    Encounter Date: 2019    Amaya Matt M.D.          PROGRESS NOTE:  Chief Complaint   Patient presents with   • Atrial Fibrillation     F/V: 6 MO       Subjective:   Carissa Marin is a 89 y.o. female who presents today for PAF, SSS s/p PPM, HTN.    In the interim, patient has been doing ok without change.    Past Medical History:   Diagnosis Date   • Abdominal pain, other specified site     pelvic   • ASTHMA    • Atrial fibrillation (HCC)    • Colon polyps    • Herpes simplex without mention of complication    • Hyperglycemia    • Hyperlipidemia    • Hypertension    • Lump or mass in breast    • Need for prophylactic hormone replacement therapy (postmenopausal)    • Nonspecific abnormal results of thyroid function study    • Osteoarthrosis, unspecified whether generalized or localized, lower leg    • Pain in joint, shoulder region     right shoulder   • Palpitations    • Pneumonia, organism unspecified(486)    • Pulmonary hypertension (HCC)    • Routine general medical examination at a health care facility    • Sinus bradycardia    • Unspecified spontaneous  without mention of complication      Past Surgical History:   Procedure Laterality Date   • PACEMAKER INSERTION Left 2012    Medtronic Versa [id number] implanted by Dr. Lewis.   • ABDOMINAL HYSTERECTOMY TOTAL      tahbso in [place] , not cancerous   • DILATION AND CURETTAGE     • TONSILLECTOMY       Family History   Problem Relation Age of Onset   • Heart Disease Father         CAD     Social History     Social History   • Marital status:      Spouse name: N/A   • Number of children: N/A   • Years of education: N/A     Occupational History   • Not on file.     Social History Main Topics   • Smoking status: Never Smoker   •  "Smokeless tobacco: Never Used   • Alcohol use Yes   • Drug use: Unknown   • Sexual activity: Not on file     Other Topics Concern   • Not on file     Social History Narrative   • No narrative on file     Allergies   Allergen Reactions   • Niacin      Bad hot rush, \"on fire\"     Outpatient Encounter Prescriptions as of 2/1/2019   Medication Sig Dispense Refill   • rivaroxaban (XARELTO) 15 MG Tab tablet Take 1 Tab by mouth with dinner. 90 Tab 3   • VITAMIN D, CHOLECALCIFEROL, PO Take  by mouth.     • [DISCONTINUED] latanoprost (XALATAN) 0.005 % Solution      • [DISCONTINUED] valsartan (DIOVAN) 160 MG Tab Take 1 Tab by mouth every day. (Patient not taking: Reported on 2/1/2019) 7 Tab 0   • nitroglycerin (NITROSTAT) 0.4 MG SL Tab Place 1 Tab under tongue as needed for Chest Pain. 25 Tab 11   • [DISCONTINUED] atorvastatin (LIPITOR) 20 MG Tab Take 1 Tab by mouth every day. (Patient not taking: Reported on 2/1/2019) 90 Tab 3   • [DISCONTINUED] B Complex Vitamins (B COMPLEX PO) Take  by mouth.     • [DISCONTINUED] Magnesium 400 MG CAPS Take  by mouth every day.     • [DISCONTINUED] aspirin EC (ECOTRIN) 81 MG TBEC Take 81 mg by mouth every day.     • [DISCONTINUED] glucosamine Sulfate 500 MG CAPS Take 500 mg by mouth 2 times a day, with meals.         No facility-administered encounter medications on file as of 2/1/2019.      Review of Systems   Constitutional: Negative for chills, fever, malaise/fatigue and weight loss.   HENT: Negative for ear discharge, ear pain, hearing loss and nosebleeds.    Eyes: Negative for blurred vision, double vision, pain and discharge.   Respiratory: Negative for cough and shortness of breath.    Cardiovascular: Negative for chest pain, palpitations, orthopnea, claudication, leg swelling and PND.   Gastrointestinal: Negative for abdominal pain, blood in stool, melena, nausea and vomiting.   Genitourinary: Negative for dysuria and hematuria.   Musculoskeletal: Negative for falls, joint pain and " "myalgias.   Skin: Negative for itching and rash.   Neurological: Positive for dizziness. Negative for sensory change, speech change, loss of consciousness and headaches.   Endo/Heme/Allergies: Negative for environmental allergies. Does not bruise/bleed easily.   Psychiatric/Behavioral: Negative for depression, hallucinations and suicidal ideas.        Objective:   /78 (BP Location: Right arm, Patient Position: Sitting, BP Cuff Size: Adult)   Pulse 64   Ht 1.6 m (5' 3\")   Wt 60.3 kg (133 lb)   SpO2 99%   BMI 23.56 kg/m²      Physical Exam   Constitutional: She is oriented to person, place, and time. No distress.   HENT:   Head: Normocephalic and atraumatic.   Right Ear: External ear normal.   Left Ear: External ear normal.   Eyes: Right eye exhibits no discharge. Left eye exhibits no discharge.   Neck: No JVD present. No thyromegaly present.   Cardiovascular: Normal rate, regular rhythm, normal heart sounds and intact distal pulses.  Exam reveals no gallop and no friction rub.    No murmur heard.  Pulmonary/Chest: Breath sounds normal. No respiratory distress.   Abdominal: Bowel sounds are normal. She exhibits no distension. There is no tenderness.   Musculoskeletal: She exhibits no edema or tenderness.   Neurological: She is alert and oriented to person, place, and time. No cranial nerve deficit.   Skin: Skin is warm and dry. She is not diaphoretic.   Psychiatric: She has a normal mood and affect. Her behavior is normal.   Nursing note and vitals reviewed.      Assessment:     1. PAF (paroxysmal atrial fibrillation) ([facility])     2. Cardiac pacemaker in situ     3. Sick sinus syndrome (HCC)     4. Mixed hyperlipidemia     5. HTN (hypertension), malignant     6. High risk medication use         Medical Decision Making:  Today's Assessment / Status / Plan:   Blood pressure is well controlled.  Continue Xarelto 15 mg po daily.      Isaac Giles M.D.  [address] [address]  VIA " Facsimile: [phone]

## 2019-07-02 ENCOUNTER — NON-PROVIDER VISIT (OUTPATIENT)
Dept: CARDIOLOGY | Facility: MEDICAL CENTER | Age: 84
End: 2019-07-02
Payer: MEDICARE

## 2019-07-02 ENCOUNTER — OFFICE VISIT (OUTPATIENT)
Dept: CARDIOLOGY | Facility: MEDICAL CENTER | Age: 84
End: 2019-07-02
Payer: MEDICARE

## 2019-07-02 VITALS
HEIGHT: 63 IN | HEART RATE: 70 BPM | DIASTOLIC BLOOD PRESSURE: 80 MMHG | OXYGEN SATURATION: 93 % | BODY MASS INDEX: 24.1 KG/M2 | WEIGHT: 136 LBS | SYSTOLIC BLOOD PRESSURE: 124 MMHG

## 2019-07-02 VITALS
OXYGEN SATURATION: 93 % | HEIGHT: 63 IN | WEIGHT: 136.6 LBS | BODY MASS INDEX: 24.2 KG/M2 | DIASTOLIC BLOOD PRESSURE: 80 MMHG | HEART RATE: 70 BPM | SYSTOLIC BLOOD PRESSURE: 124 MMHG

## 2019-07-02 DIAGNOSIS — Z79.899 HIGH RISK MEDICATION USE: ICD-10-CM

## 2019-07-02 DIAGNOSIS — I48.0 PAF (PAROXYSMAL ATRIAL FIBRILLATION) (HCC): ICD-10-CM

## 2019-07-02 DIAGNOSIS — E78.2 MIXED HYPERLIPIDEMIA: ICD-10-CM

## 2019-07-02 DIAGNOSIS — I10 HTN (HYPERTENSION), MALIGNANT: ICD-10-CM

## 2019-07-02 DIAGNOSIS — Z95.0 CARDIAC PACEMAKER IN SITU: ICD-10-CM

## 2019-07-02 DIAGNOSIS — I49.5 SICK SINUS SYNDROME (HCC): ICD-10-CM

## 2019-07-02 DIAGNOSIS — I48.0 PAROXYSMAL ATRIAL FIBRILLATION (HCC): ICD-10-CM

## 2019-07-02 PROCEDURE — 99214 OFFICE O/P EST MOD 30 MIN: CPT | Performed by: INTERNAL MEDICINE

## 2019-07-02 PROCEDURE — 93280 PM DEVICE PROGR EVAL DUAL: CPT | Performed by: NURSE PRACTITIONER

## 2019-07-02 RX ORDER — LATANOPROST 50 UG/ML
SOLUTION/ DROPS OPHTHALMIC
COMMUNITY
Start: 2019-05-01 | End: 2021-03-02

## 2019-07-02 RX ORDER — ACYCLOVIR 400 MG/1
TABLET ORAL
COMMUNITY
Start: 2019-06-03 | End: 2020-02-25

## 2019-07-02 ASSESSMENT — ENCOUNTER SYMPTOMS
VOMITING: 0
LOSS OF CONSCIOUSNESS: 0
SENSORY CHANGE: 0
MYALGIAS: 0
BRUISES/BLEEDS EASILY: 0
WEIGHT LOSS: 0
DOUBLE VISION: 0
FEVER: 0
FALLS: 0
NAUSEA: 0
CHILLS: 0
SHORTNESS OF BREATH: 0
SPEECH CHANGE: 0
ORTHOPNEA: 0
DEPRESSION: 0
EYE DISCHARGE: 0
ABDOMINAL PAIN: 0
COUGH: 0
HEADACHES: 0
CLAUDICATION: 0
PALPITATIONS: 0
HALLUCINATIONS: 0
BLOOD IN STOOL: 0
EYE PAIN: 0
BLURRED VISION: 0
DIZZINESS: 0
PND: 0

## 2019-07-02 NOTE — PROGRESS NOTES
Chief Complaint   Patient presents with   • Hyperlipidemia   • HTN (Controlled)   • Atrial Fibrillation       Subjective:   Carissa Marin is a 89 y.o. female who presents today for PAF, SSS s/p PPM, HTN.     In the interim, patient has been doing ok without change.    Living in assisted living.     Past Medical History:   Diagnosis Date   • Abdominal pain, other specified site     pelvic   • ASTHMA    • Atrial fibrillation (HCC)    • Colon polyps    • Herpes simplex without mention of complication    • Hyperglycemia    • Hyperlipidemia    • Hypertension    • Lump or mass in breast    • Need for prophylactic hormone replacement therapy (postmenopausal)    • Nonspecific abnormal results of thyroid function study    • Osteoarthrosis, unspecified whether generalized or localized, lower leg    • Pain in joint, shoulder region     right shoulder   • Palpitations    • Pneumonia, organism unspecified(486)    • Pulmonary hypertension (HCC)    • Routine general medical examination at a health care facility    • Sinus bradycardia    • Unspecified spontaneous  without mention of complication      Past Surgical History:   Procedure Laterality Date   • PACEMAKER INSERTION Left 2012    Medtronic Versa [id number] implanted by Dr. Lewis.   • ABDOMINAL HYSTERECTOMY TOTAL      tahbso in [place] , not cancerous   • DILATION AND CURETTAGE     • TONSILLECTOMY       Family History   Problem Relation Age of Onset   • Heart Disease Father         CAD     Social History     Social History   • Marital status:      Spouse name: N/A   • Number of children: N/A   • Years of education: N/A     Occupational History   • Not on file.     Social History Main Topics   • Smoking status: Never Smoker   • Smokeless tobacco: Never Used   • Alcohol use Yes   • Drug use: Unknown   • Sexual activity: Not on file     Other Topics Concern   • Not on file     Social History Narrative   • No narrative on file     Allergies  "  Allergen Reactions   • Niacin      Bad hot rush, \"on fire\"     Outpatient Encounter Prescriptions as of 7/2/2019   Medication Sig Dispense Refill   • latanoprost (XALATAN) 0.005 % Solution      • rivaroxaban (XARELTO) 15 MG Tab tablet Take 1 Tab by mouth with dinner. 90 Tab 3   • VITAMIN D, CHOLECALCIFEROL, PO Take  by mouth.     • acyclovir (ZOVIRAX) 400 MG tablet      • [DISCONTINUED] rivaroxaban (XARELTO) 15 MG Tab tablet Take 1 Tab by mouth with dinner. 90 Tab 3   • nitroglycerin (NITROSTAT) 0.4 MG SL Tab Place 1 Tab under tongue as needed for Chest Pain. 25 Tab 11     No facility-administered encounter medications on file as of 7/2/2019.      Review of Systems   Constitutional: Positive for malaise/fatigue. Negative for chills, fever and weight loss.   HENT: Negative for ear discharge, ear pain, hearing loss and nosebleeds.    Eyes: Negative for blurred vision, double vision, pain and discharge.   Respiratory: Negative for cough and shortness of breath.    Cardiovascular: Negative for chest pain, palpitations, orthopnea, claudication, leg swelling and PND.   Gastrointestinal: Negative for abdominal pain, blood in stool, melena, nausea and vomiting.   Genitourinary: Negative for dysuria and hematuria.   Musculoskeletal: Negative for falls, joint pain and myalgias.   Skin: Negative for itching and rash.   Neurological: Negative for dizziness, sensory change, speech change, loss of consciousness and headaches.   Endo/Heme/Allergies: Negative for environmental allergies. Does not bruise/bleed easily.   Psychiatric/Behavioral: Negative for depression, hallucinations and suicidal ideas.        Objective:   /80 (BP Location: Left arm, Patient Position: Sitting, BP Cuff Size: Adult)   Pulse 70   Ht 1.6 m (5' 3\")   Wt 61.7 kg (136 lb)   SpO2 93%   BMI 24.09 kg/m²     Physical Exam   Constitutional: She is oriented to person, place, and time. No distress.   HENT:   Head: Normocephalic and atraumatic.   Right " Ear: External ear normal.   Left Ear: External ear normal.   Eyes: Right eye exhibits no discharge. Left eye exhibits no discharge.   Neck: No JVD present. No thyromegaly present.   Cardiovascular: Normal rate, regular rhythm and normal heart sounds.  Exam reveals no gallop and no friction rub.    No murmur heard.  Pulmonary/Chest: Breath sounds normal. No respiratory distress.   Abdominal: Bowel sounds are normal. She exhibits no distension. There is no tenderness.   Musculoskeletal: She exhibits no edema or tenderness.   Neurological: She is alert and oriented to person, place, and time. No cranial nerve deficit.   Skin: Skin is warm and dry. She is not diaphoretic.   Psychiatric: She has a normal mood and affect. Her behavior is normal.   Nursing note and vitals reviewed.      Assessment:     1. Sick sinus syndrome (HCC)     2. Cardiac pacemaker in situ     3. PAF (paroxysmal atrial fibrillation) (HCC)  rivaroxaban (XARELTO) 15 MG Tab tablet   4. HTN (hypertension), malignant     5. Mixed hyperlipidemia  LIPID PANEL   6. High risk medication use  Comp Metabolic Panel       Medical Decision Making:  Today's Assessment / Status / Plan:   SSS s/p PPM:  Pacemaker is working properly, I personally interpreted the result.    Paroxysmal atrial fibrillation:  Continue anticoagulation with Xarelto 15 mg daily.    Hypertension:  Controlled today.    I will see patient back in clinic with lab tests and studies results in 6 months.    I thank you for referring patient to our Cardiology Clinic today.

## 2019-07-02 NOTE — PROGRESS NOTES
Device is working normally. 2 brief mode switching episodes (2 minutes each, <0.1% of total time); she is anticoagulated with Xarelto.  Normal sensing of RV lead; unable to measure P waves. Stable capture of RA and RV leads; stable impedances. Battery longevity is 4.5 years.  No changes are made today.    She does see Dr. Matt today as well.    FU in 6 months for next PM check.    Collaborating MD: Shaka

## 2019-09-16 ENCOUNTER — TELEPHONE (OUTPATIENT)
Dept: CARDIOLOGY | Facility: MEDICAL CENTER | Age: 84
End: 2019-09-16

## 2019-09-16 NOTE — TELEPHONE ENCOUNTER
Returned Dominique's call. Patient is scheduled to have a front single dental extraction. Dominique advised that a hold would not be indicated. She states she is having some difficulty with the new dental office but will keep us advised if need be. She is very appreciative of call back, information and assistance.

## 2019-09-16 NOTE — TELEPHONE ENCOUNTER
TT    Pt's daughter, Dominique left message on voicemail. She is calling for Xarelto hold advice prior to some dental work. Please call Dominique at [phone] for further details.

## 2020-01-02 NOTE — LETTER
[facility]   [address]   [address]  [place] [address]  Phone: [phone]  Fax: [phone]              Carissa Marin  1930    Encounter Date: 2019    Amaya Matt M.D.          PROGRESS NOTE:  Chief Complaint   Patient presents with   • Hyperlipidemia   • HTN (Controlled)   • Atrial Fibrillation       Subjective:   Carissa Marin is a 89 y.o. female who presents today for PAF, SSS s/p PPM, HTN.     In the interim, patient has been doing ok without change.    Living in assisted living.     Past Medical History:   Diagnosis Date   • Abdominal pain, other specified site     pelvic   • ASTHMA    • Atrial fibrillation (HCC)    • Colon polyps    • Herpes simplex without mention of complication    • Hyperglycemia    • Hyperlipidemia    • Hypertension    • Lump or mass in breast    • Need for prophylactic hormone replacement therapy (postmenopausal)    • Nonspecific abnormal results of thyroid function study    • Osteoarthrosis, unspecified whether generalized or localized, lower leg    • Pain in joint, shoulder region     right shoulder   • Palpitations    • Pneumonia, organism unspecified(486)    • Pulmonary hypertension (HCC)    • Routine general medical examination at a health care facility    • Sinus bradycardia    • Unspecified spontaneous  without mention of complication      Past Surgical History:   Procedure Laterality Date   • PACEMAKER INSERTION Left 2012    Medtronic Versa [id number] implanted by Dr. Lewis.   • ABDOMINAL HYSTERECTOMY TOTAL      tahb in [place] , not cancerous   • DILATION AND CURETTAGE     • TONSILLECTOMY       Family History   Problem Relation Age of Onset   • Heart Disease Father         CAD     Social History     Social History   • Marital status:      Spouse name: N/A   • Number of children: N/A   • Years of education: N/A     Occupational History   • Not on file.  "    Social History Main Topics   • Smoking status: Never Smoker   • Smokeless tobacco: Never Used   • Alcohol use Yes   • Drug use: Unknown   • Sexual activity: Not on file     Other Topics Concern   • Not on file     Social History Narrative   • No narrative on file     Allergies   Allergen Reactions   • Niacin      Bad hot rush, \"on fire\"     Outpatient Encounter Prescriptions as of 7/2/2019   Medication Sig Dispense Refill   • latanoprost (XALATAN) 0.005 % Solution      • rivaroxaban (XARELTO) 15 MG Tab tablet Take 1 Tab by mouth with dinner. 90 Tab 3   • VITAMIN D, CHOLECALCIFEROL, PO Take  by mouth.     • acyclovir (ZOVIRAX) 400 MG tablet      • [DISCONTINUED] rivaroxaban (XARELTO) 15 MG Tab tablet Take 1 Tab by mouth with dinner. 90 Tab 3   • nitroglycerin (NITROSTAT) 0.4 MG SL Tab Place 1 Tab under tongue as needed for Chest Pain. 25 Tab 11     No facility-administered encounter medications on file as of 7/2/2019.      Review of Systems   Constitutional: Positive for malaise/fatigue. Negative for chills, fever and weight loss.   HENT: Negative for ear discharge, ear pain, hearing loss and nosebleeds.    Eyes: Negative for blurred vision, double vision, pain and discharge.   Respiratory: Negative for cough and shortness of breath.    Cardiovascular: Negative for chest pain, palpitations, orthopnea, claudication, leg swelling and PND.   Gastrointestinal: Negative for abdominal pain, blood in stool, melena, nausea and vomiting.   Genitourinary: Negative for dysuria and hematuria.   Musculoskeletal: Negative for falls, joint pain and myalgias.   Skin: Negative for itching and rash.   Neurological: Negative for dizziness, sensory change, speech change, loss of consciousness and headaches.   Endo/Heme/Allergies: Negative for environmental allergies. Does not bruise/bleed easily.   Psychiatric/Behavioral: Negative for depression, hallucinations and suicidal ideas.        Objective:   /80 (BP Location: Left " "arm, Patient Position: Sitting, BP Cuff Size: Adult)   Pulse 70   Ht 1.6 m (5' 3\")   Wt 61.7 kg (136 lb)   SpO2 93%   BMI 24.09 kg/m²      Physical Exam   Constitutional: She is oriented to person, place, and time. No distress.   HENT:   Head: Normocephalic and atraumatic.   Right Ear: External ear normal.   Left Ear: External ear normal.   Eyes: Right eye exhibits no discharge. Left eye exhibits no discharge.   Neck: No JVD present. No thyromegaly present.   Cardiovascular: Normal rate, regular rhythm and normal heart sounds.  Exam reveals no gallop and no friction rub.    No murmur heard.  Pulmonary/Chest: Breath sounds normal. No respiratory distress.   Abdominal: Bowel sounds are normal. She exhibits no distension. There is no tenderness.   Musculoskeletal: She exhibits no edema or tenderness.   Neurological: She is alert and oriented to person, place, and time. No cranial nerve deficit.   Skin: Skin is warm and dry. She is not diaphoretic.   Psychiatric: She has a normal mood and affect. Her behavior is normal.   Nursing note and vitals reviewed.      Assessment:     1. Sick sinus syndrome (HCC)     2. Cardiac pacemaker in situ     3. PAF (paroxysmal atrial fibrillation) (HCC)  rivaroxaban (XARELTO) 15 MG Tab tablet   4. HTN (hypertension), malignant     5. Mixed hyperlipidemia  LIPID PANEL   6. High risk medication use  Comp Metabolic Panel       Medical Decision Making:  Today's Assessment / Status / Plan:   SSS s/p PPM:  Pacemaker is working properly, I personally interpreted the result.    Paroxysmal atrial fibrillation:  Continue anticoagulation with Xarelto 15 mg daily.    Hypertension:  Controlled today.    I will see patient back in clinic with lab tests and studies results in 6 months.    I thank you for referring patient to our Cardiology Clinic today.          Isaac Giles MD  [address] [facility] [address]  VIA Facsimile: [phone]                 " Wound Care (No Sutures): Petrolatum

## 2020-01-14 ENCOUNTER — TELEPHONE (OUTPATIENT)
Dept: CARDIOLOGY | Facility: MEDICAL CENTER | Age: 85
End: 2020-01-14

## 2020-01-14 DIAGNOSIS — H35.82 OCULAR ISCHEMIC SYNDROME: ICD-10-CM

## 2020-01-14 DIAGNOSIS — I10 ESSENTIAL HYPERTENSION: ICD-10-CM

## 2020-01-14 DIAGNOSIS — I48.0 PAF (PAROXYSMAL ATRIAL FIBRILLATION) (HCC): ICD-10-CM

## 2020-01-14 DIAGNOSIS — H35.60 RETINAL HEMORRHAGE, UNSPECIFIED LATERALITY: ICD-10-CM

## 2020-01-14 NOTE — TELEPHONE ENCOUNTER
Received records from [place] for [facility]. Discussed with VR and reviewed records. Per VR, ok to order the testing they recommend: urgent carotid ultrasound, ESR, CRP, CBC. Also ok to hold Xarelto until retinal hemorrhaging has resolved. LVM at [phone] requesting call back to discuss recommendations. Also attempted call to [phone], but VM was not set up. Order placed for carotid US. Will potentially need to place orders for labs, though optometrist's records mention pt having lab order already.

## 2020-01-14 NOTE — TELEPHONE ENCOUNTER
Dominique returned call. Discussed recommendations for carotid US and lab work. Eye doctor did not provide order for their recommended ESR and CRP. Orders placed for these. Discussed that pt should hold Xarelto until retinal hemorrhaging has resolved. Dominique understands the necessary additional testing and would like pt to complete at [facility]. Obtained correct fax numbers from [facility]. Faxed lab orders to [phone] and carotid US order to [phone]. Receipt confirmed for both.

## 2020-01-14 NOTE — LETTER
January 15, 2020        Carissa Marin  [address] [address]        To Whom it May Concern:    Due to retinal hemorrhaging, Ms Carissa Marin should hold Xarelto until ophthalmologist clears her to re-start.      If you have any questions or concerns, please don't hesitate to call.        Sincerely,      Vel Ko MD

## 2020-01-14 NOTE — TELEPHONE ENCOUNTER
"Returned Dominique's call. Pt was seen by the eye doctor today and was told that she has severe retinal hemorrhaging and \"blockages in her neck.\" The eye doctor did not instruct pt to stop Xarelto, but rather wanted her to reach out to her cardiologist for further instruction. STAT records request sent to [place] [facility] at [phone]. Receipt confirmed.     No ADD today, To VR in TT absence  "

## 2020-01-14 NOTE — TELEPHONE ENCOUNTER
TT    Maximus Jung,    Pt’s daughter Dominique called and said that her mother went to [facility] and that they found retinal hemorrhaging. She says that the Eye Dr is concerned that there is blockage in her neck or maybe that this maybe due to her Xarelto. That is causing the bleeding. She says her mother has an appt for PMC and TT on 01/31 and she also wants to know if her Mom can wait or if she needs a much sooner appt. She would like a call back at: [phone].    Thank you so much,    Reed

## 2020-01-15 NOTE — TELEPHONE ENCOUNTER
Dominique perdomo. Pt's facility, [facility] in [place], will not hold Xarelto until they receive an order. They also would like the orders placed yesterday to be faxed to them. Letter drafted instructing pt to hold Xarelto. Faxed this along with lab orders and carotid US order to [phone], attn: Rachel. Receipt confirmed.

## 2020-01-28 ENCOUNTER — TELEPHONE (OUTPATIENT)
Dept: CARDIOLOGY | Facility: MEDICAL CENTER | Age: 85
End: 2020-01-28

## 2020-01-28 NOTE — TELEPHONE ENCOUNTER
S/w patient's  Holger and left a message with him in regards to the patient's lab work. He stated that he will write down the message and relay it to the patient's sister and his wife.     I gave the appt date and time of Dr. Matt on 01/31/2020 as well as the patient's pacer check appt at 10:15 am. I gave directions of where the office is located also.     Patient thanked me for the call.

## 2020-01-28 NOTE — TELEPHONE ENCOUNTER
I called Stormy at [facility] in the Medical records department. I asked if she could please fax most recent labs, d/c summary and any cardiac testing to fax # [phone]. Stormy was very helpful and stated that she would do so.     P# [phone]

## 2020-01-28 NOTE — TELEPHONE ENCOUNTER
Maria Dolores,       Patient's daughter called and said her mother was hospitalized last week at [facility] for a blockage in her kidneys. She had a lot of testing and labs done. She is asking for you to contact [facility] and request the results so she doesn't need to take her mother to the lab again.

## 2020-01-31 ENCOUNTER — OFFICE VISIT (OUTPATIENT)
Dept: CARDIOLOGY | Facility: MEDICAL CENTER | Age: 85
End: 2020-01-31
Payer: MEDICARE

## 2020-01-31 ENCOUNTER — NON-PROVIDER VISIT (OUTPATIENT)
Dept: CARDIOLOGY | Facility: MEDICAL CENTER | Age: 85
End: 2020-01-31
Payer: MEDICARE

## 2020-01-31 VITALS
DIASTOLIC BLOOD PRESSURE: 74 MMHG | HEIGHT: 63 IN | SYSTOLIC BLOOD PRESSURE: 148 MMHG | RESPIRATION RATE: 16 BRPM | WEIGHT: 132 LBS | OXYGEN SATURATION: 98 % | HEART RATE: 70 BPM | BODY MASS INDEX: 23.39 KG/M2

## 2020-01-31 DIAGNOSIS — E78.2 MIXED HYPERLIPIDEMIA: ICD-10-CM

## 2020-01-31 DIAGNOSIS — Z79.899 HIGH RISK MEDICATION USE: ICD-10-CM

## 2020-01-31 DIAGNOSIS — I48.0 PAROXYSMAL ATRIAL FIBRILLATION (HCC): ICD-10-CM

## 2020-01-31 DIAGNOSIS — I49.5 SICK SINUS SYNDROME (HCC): ICD-10-CM

## 2020-01-31 DIAGNOSIS — Z95.0 CARDIAC PACEMAKER IN SITU: ICD-10-CM

## 2020-01-31 DIAGNOSIS — I10 HTN (HYPERTENSION), MALIGNANT: ICD-10-CM

## 2020-01-31 PROCEDURE — 99214 OFFICE O/P EST MOD 30 MIN: CPT | Performed by: INTERNAL MEDICINE

## 2020-01-31 PROCEDURE — 93280 PM DEVICE PROGR EVAL DUAL: CPT | Performed by: INTERNAL MEDICINE

## 2020-01-31 RX ORDER — AMLODIPINE BESYLATE 5 MG/1
TABLET ORAL
COMMUNITY
Start: 2020-01-20 | End: 2020-02-25

## 2020-01-31 RX ORDER — HYDRALAZINE HYDROCHLORIDE 50 MG/1
50 TABLET, FILM COATED ORAL 2 TIMES DAILY
Qty: 60 TAB | Refills: 3 | Status: SHIPPED | OUTPATIENT
Start: 2020-01-31 | End: 2020-02-25

## 2020-01-31 RX ORDER — ATORVASTATIN CALCIUM 40 MG/1
TABLET, FILM COATED ORAL
COMMUNITY
Start: 2020-01-20 | End: 2020-02-18 | Stop reason: SDUPTHER

## 2020-01-31 RX ORDER — CLOPIDOGREL BISULFATE 75 MG/1
TABLET ORAL
COMMUNITY
Start: 2020-01-20 | End: 2020-09-29 | Stop reason: SDUPTHER

## 2020-01-31 ASSESSMENT — ENCOUNTER SYMPTOMS
FEVER: 0
HALLUCINATIONS: 0
WEIGHT LOSS: 0
DOUBLE VISION: 0
SENSORY CHANGE: 0
NAUSEA: 0
ABDOMINAL PAIN: 0
DEPRESSION: 0
EYE DISCHARGE: 0
VOMITING: 0
ORTHOPNEA: 0
COUGH: 0
EYE PAIN: 0
MYALGIAS: 0
MEMORY LOSS: 1
SHORTNESS OF BREATH: 0
FALLS: 0
BLOOD IN STOOL: 0
PALPITATIONS: 0
LOSS OF CONSCIOUSNESS: 0
BLURRED VISION: 0
PND: 0
CLAUDICATION: 0
HEADACHES: 0
CHILLS: 0
SPEECH CHANGE: 0
BRUISES/BLEEDS EASILY: 0
DIZZINESS: 0

## 2020-01-31 NOTE — PROGRESS NOTES
Chief Complaint   Patient presents with   • Hypertension     FV       Subjective:   Carissa Marin is a 89 y.o. female who presents today for PAF, SSS s/p PPM, HTN.    Patient was recently in the hospital at [facility] because of hypertensive emergency.  Per report, she underwent renal stenting.  That is why she is on Plavix at this time.  She also had cardiac testing which did not find any abnormalities per report.  We are still obtaining her records from [facility] at this time.     Living in assisted living.    Past Medical History:   Diagnosis Date   • Abdominal pain, other specified site     pelvic   • ASTHMA    • Atrial fibrillation (HCC)    • Colon polyps    • Herpes simplex without mention of complication    • Hyperglycemia    • Hyperlipidemia    • Hypertension    • Lump or mass in breast    • Need for prophylactic hormone replacement therapy (postmenopausal)    • Nonspecific abnormal results of thyroid function study    • Osteoarthrosis, unspecified whether generalized or localized, lower leg    • Pain in joint, shoulder region     right shoulder   • Palpitations    • Pneumonia, organism unspecified(486)    • Pulmonary hypertension (HCC)    • Routine general medical examination at a health care facility    • Sinus bradycardia    • Unspecified spontaneous  without mention of complication      Past Surgical History:   Procedure Laterality Date   • PACEMAKER INSERTION Left 2012    Medtronic Versa VEDR01 implanted by Dr. Lewis.   • ABDOMINAL HYSTERECTOMY TOTAL      tahbso in ileana , not cancerous   • DILATION AND CURETTAGE     • TONSILLECTOMY       Family History   Problem Relation Age of Onset   • Heart Disease Father         CAD     Social History     Socioeconomic History   • Marital status:      Spouse name: Not on file   • Number of children: Not on file   • Years of education: Not on file   • Highest education level: Not on file   Occupational History   •  "Not on file   Social Needs   • Financial resource strain: Not on file   • Food insecurity:     Worry: Not on file     Inability: Not on file   • Transportation needs:     Medical: Not on file     Non-medical: Not on file   Tobacco Use   • Smoking status: Never Smoker   • Smokeless tobacco: Never Used   Substance and Sexual Activity   • Alcohol use: Yes   • Drug use: Not on file   • Sexual activity: Not on file   Lifestyle   • Physical activity:     Days per week: Not on file     Minutes per session: Not on file   • Stress: Not on file   Relationships   • Social connections:     Talks on phone: Not on file     Gets together: Not on file     Attends Buddhist service: Not on file     Active member of club or organization: Not on file     Attends meetings of clubs or organizations: Not on file     Relationship status: Not on file   • Intimate partner violence:     Fear of current or ex partner: Not on file     Emotionally abused: Not on file     Physically abused: Not on file     Forced sexual activity: Not on file   Other Topics Concern   • Not on file   Social History Narrative   • Not on file     Allergies   Allergen Reactions   • Niacin      Bad hot rush, \"on fire\"     Outpatient Encounter Medications as of 1/31/2020   Medication Sig Dispense Refill   • hydrALAZINE (APRESOLINE) 50 MG Tab Take 1 Tab by mouth 2 Times a Day. 60 Tab 3   • clopidogrel (PLAVIX) 75 MG Tab      • atorvastatin (LIPITOR) 40 MG Tab      • amLODIPine (NORVASC) 5 MG Tab      • latanoprost (XALATAN) 0.005 % Solution      • acyclovir (ZOVIRAX) 400 MG tablet      • [DISCONTINUED] rivaroxaban (XARELTO) 15 MG Tab tablet Take 1 Tab by mouth with dinner. (Patient not taking: Reported on 1/31/2020) 90 Tab 3   • nitroglycerin (NITROSTAT) 0.4 MG SL Tab Place 1 Tab under tongue as needed for Chest Pain. 25 Tab 11   • VITAMIN D, CHOLECALCIFEROL, PO Take  by mouth.       No facility-administered encounter medications on file as of 1/31/2020.      Review of " "Systems   Constitutional: Negative for chills, fever, malaise/fatigue and weight loss.   HENT: Negative for ear discharge, ear pain, hearing loss and nosebleeds.    Eyes: Negative for blurred vision, double vision, pain and discharge.   Respiratory: Negative for cough and shortness of breath.    Cardiovascular: Negative for chest pain, palpitations, orthopnea, claudication, leg swelling and PND.   Gastrointestinal: Negative for abdominal pain, blood in stool, melena, nausea and vomiting.   Genitourinary: Negative for dysuria and hematuria.   Musculoskeletal: Negative for falls, joint pain and myalgias.   Skin: Negative for itching and rash.   Neurological: Negative for dizziness, sensory change, speech change, loss of consciousness and headaches.   Endo/Heme/Allergies: Negative for environmental allergies. Does not bruise/bleed easily.   Psychiatric/Behavioral: Positive for memory loss. Negative for depression, hallucinations and suicidal ideas.        Objective:   /74 (BP Location: Right arm, Patient Position: Sitting, BP Cuff Size: Adult)   Pulse 70   Resp 16   Ht 1.6 m (5' 3\")   Wt 59.9 kg (132 lb)   SpO2 98%   BMI 23.38 kg/m²     Physical Exam   Constitutional: She is oriented to person, place, and time. No distress.   HENT:   Head: Normocephalic and atraumatic.   Right Ear: External ear normal.   Left Ear: External ear normal.   Eyes: Right eye exhibits no discharge. Left eye exhibits no discharge.   Neck: No JVD present. No thyromegaly present.   Cardiovascular: Normal rate, regular rhythm, normal heart sounds and intact distal pulses. Exam reveals no gallop and no friction rub.   No murmur heard.  Pulmonary/Chest: Breath sounds normal. No respiratory distress.   Abdominal: Bowel sounds are normal. She exhibits no distension. There is no tenderness.   Musculoskeletal:         General: No tenderness or edema.   Neurological: She is alert and oriented to person, place, and time. No cranial nerve " deficit.   Skin: Skin is warm and dry. She is not diaphoretic.   Psychiatric: She has a normal mood and affect. Her behavior is normal.   Nursing note and vitals reviewed.      Assessment:     1. Paroxysmal atrial fibrillation (HCC)     2. Cardiac pacemaker in situ     3. Sick sinus syndrome (HCC)     4. HTN (hypertension), malignant     5. Mixed hyperlipidemia     6. High risk medication use         Medical Decision Making:  Today's Assessment / Status / Plan:   SSS s/p PPM:  Pacemaker is working properly, I personally interpreted the result.     Paroxysmal atrial fibrillation:  Continue Asa and Clopidogrel.  No anticoagulation to avoid triple therapy.     Hypertension:  We will add hydralazine 50 mg p.o. twice a day along with amlodipine 5 mg p.o. once a day.    Renal stent:  Continue aspirin and Plavix.     I will see patient back in clinic with lab tests and studies results in 6 weeks.     I thank you for referring patient to our Cardiology Clinic today.

## 2020-02-18 ENCOUNTER — TELEPHONE (OUTPATIENT)
Dept: CARDIOLOGY | Facility: MEDICAL CENTER | Age: 85
End: 2020-02-18

## 2020-02-18 DIAGNOSIS — E78.2 MIXED HYPERLIPIDEMIA: Primary | Chronic | ICD-10-CM

## 2020-02-18 RX ORDER — ATORVASTATIN CALCIUM 40 MG/1
40 TABLET, FILM COATED ORAL DAILY
Qty: 90 TAB | Refills: 3 | Status: SHIPPED | OUTPATIENT
Start: 2020-02-18 | End: 2020-09-29

## 2020-02-19 NOTE — TELEPHONE ENCOUNTER
Pt of TT. Linda [facility] and  with Assisted living center is calling on behalf of pt. She is requesting urgent refill for atorvastatin as she only has enough for today. Per Linda please send prescription to [facility] fax # given [phone]. If question's Linda can be reached at [phone].    Thank You

## 2020-02-21 ENCOUNTER — TELEPHONE (OUTPATIENT)
Dept: CARDIOLOGY | Facility: MEDICAL CENTER | Age: 85
End: 2020-02-21

## 2020-02-21 NOTE — TELEPHONE ENCOUNTER
TT      Patient's daughter Dominique is calling to discuss her mother's swelling. She said it's 3+ edema per staff at [facility] where her mother lives. She said her mother also has swelling in her hands and arms. Patient's blood pressure is running high. She can be reached at [phone].

## 2020-02-21 NOTE — TELEPHONE ENCOUNTER
Returned call. Dominique reports that about 2 weeks ago she noticed pt developing swelling in her feet and legs, and now her hands and arms have started swelling as well. She says that the assisted living staff told her swelling is 3+ in her feet and 1+ in the rest of her legs. She does say that pt started taking Amlodipine on 2/14/20, but they noticed the swelling prior to this. She has tried elevation and compression stockings, but this has not helped. She denies any change in sodium or fluid intake. She denies SOB. She says that pt has gained about 8 lbs in the last 3 weeks. BP usually runs in the 140s/70s, but they do see readings in the 170s. Scheduled pt with SHELBY on 2/25/20 at 2:15pm.    RUSTYI to WOO and SHELBY

## 2020-02-25 ENCOUNTER — OFFICE VISIT (OUTPATIENT)
Dept: CARDIOLOGY | Facility: MEDICAL CENTER | Age: 85
End: 2020-02-25
Payer: MEDICARE

## 2020-02-25 VITALS
HEART RATE: 80 BPM | DIASTOLIC BLOOD PRESSURE: 64 MMHG | OXYGEN SATURATION: 95 % | HEIGHT: 63 IN | WEIGHT: 141 LBS | SYSTOLIC BLOOD PRESSURE: 160 MMHG | BODY MASS INDEX: 24.98 KG/M2

## 2020-02-25 DIAGNOSIS — I10 ESSENTIAL HYPERTENSION: Chronic | ICD-10-CM

## 2020-02-25 DIAGNOSIS — Z95.0 CARDIAC PACEMAKER IN SITU: ICD-10-CM

## 2020-02-25 DIAGNOSIS — I27.20 PULMONARY HTN (HCC): Chronic | ICD-10-CM

## 2020-02-25 DIAGNOSIS — I50.33 ACUTE ON CHRONIC DIASTOLIC HEART FAILURE (HCC): ICD-10-CM

## 2020-02-25 DIAGNOSIS — I49.5 SICK SINUS SYNDROME (HCC): ICD-10-CM

## 2020-02-25 DIAGNOSIS — I48.0 PAROXYSMAL ATRIAL FIBRILLATION (HCC): ICD-10-CM

## 2020-02-25 DIAGNOSIS — R60.0 LOCALIZED EDEMA: ICD-10-CM

## 2020-02-25 DIAGNOSIS — I10 ESSENTIAL HYPERTENSION, BENIGN: ICD-10-CM

## 2020-02-25 PROCEDURE — 99214 OFFICE O/P EST MOD 30 MIN: CPT | Performed by: NURSE PRACTITIONER

## 2020-02-25 RX ORDER — VALSARTAN 40 MG/1
40 TABLET ORAL DAILY
Qty: 30 TAB | Refills: 11 | Status: SHIPPED | OUTPATIENT
Start: 2020-02-25 | End: 2020-02-25 | Stop reason: SDUPTHER

## 2020-02-25 RX ORDER — AMLODIPINE BESYLATE 5 MG/1
5 TABLET ORAL DAILY
Qty: 30 TAB | Refills: 11 | Status: SHIPPED | OUTPATIENT
Start: 2020-02-25 | End: 2020-04-14 | Stop reason: SDUPTHER

## 2020-02-25 RX ORDER — VALSARTAN 40 MG/1
40 TABLET ORAL EVERY EVENING
Qty: 30 TAB | Refills: 11 | Status: SHIPPED | OUTPATIENT
Start: 2020-02-25 | End: 2020-02-25

## 2020-02-25 RX ORDER — CARVEDILOL 6.25 MG/1
6.25 TABLET ORAL 2 TIMES DAILY WITH MEALS
Qty: 60 TAB | Refills: 11 | Status: SHIPPED
Start: 2020-02-25 | End: 2020-02-25

## 2020-02-25 RX ORDER — VALSARTAN 40 MG/1
40 TABLET ORAL DAILY
Qty: 30 TAB | Refills: 11 | Status: SHIPPED
Start: 2020-02-25 | End: 2020-02-25 | Stop reason: SDUPTHER

## 2020-02-25 RX ORDER — VALSARTAN 40 MG/1
80 TABLET ORAL DAILY
Qty: 30 TAB | Refills: 11 | Status: SHIPPED | OUTPATIENT
Start: 2020-02-25 | End: 2020-02-25 | Stop reason: SDUPTHER

## 2020-02-25 RX ORDER — HYDRALAZINE HYDROCHLORIDE 50 MG/1
50 TABLET, FILM COATED ORAL 2 TIMES DAILY
Qty: 60 TAB | Refills: 3 | Status: SHIPPED | OUTPATIENT
Start: 2020-02-25 | End: 2020-09-29 | Stop reason: SDUPTHER

## 2020-02-25 RX ORDER — POTASSIUM CHLORIDE 600 MG/1
8 TABLET, FILM COATED, EXTENDED RELEASE ORAL DAILY
Qty: 30 TAB | Refills: 0 | Status: SHIPPED
Start: 2020-02-25 | End: 2020-03-10 | Stop reason: SDUPTHER

## 2020-02-25 RX ORDER — FUROSEMIDE 20 MG/1
20 TABLET ORAL DAILY
Qty: 30 TAB | Refills: 0 | Status: SHIPPED
Start: 2020-02-25 | End: 2020-03-10

## 2020-02-25 NOTE — PROGRESS NOTES
Chief Complaint   Patient presents with   • Pulmonary Hypertension   • Atrial Fibrillation       Subjective:   Carissa Marin is a 89 y.o. female who presents today for follow up regarding his PAF, SSS S/P PPM and HTN.     Patient was last seen by Dr. Matt on 20 for hospital follow up for hypertensive crisis. Per patient he underwent renal stenting. Hydralazine 50 mg PO BID along with amlodipine 5 mg daily was added to the patients regimen.     Patient was admitted on 1/15/2020 with hypertensive urgency and bilateral extremity edema. Renal ultrasound showed right renal artery stenosis and she underwent successful stenting on 2020. Echo showed preserved LVEF of 60-70%, moderate MR, elevated RVSP of 70 mmHg and mild to moderate diastolic dysfunction.     Other past medical history significant for hypertension and hyperlipidemia.     Today patient states that her lower extremity was stable after she was discharged from the hospital but started to return in the last two weeks. Denies shortness of breath     Past Medical History:   Diagnosis Date   • Abdominal pain, other specified site     pelvic   • ASTHMA    • Atrial fibrillation (HCC)    • Colon polyps    • Herpes simplex without mention of complication    • Hyperglycemia    • Hyperlipidemia    • Hypertension    • Lump or mass in breast    • Need for prophylactic hormone replacement therapy (postmenopausal)    • Nonspecific abnormal results of thyroid function study    • Osteoarthrosis, unspecified whether generalized or localized, lower leg    • Pain in joint, shoulder region     right shoulder   • Palpitations    • Pneumonia, organism unspecified(486)    • Pulmonary hypertension (HCC)    • Routine general medical examination at a health care facility    • Sinus bradycardia    • Unspecified spontaneous  without mention of complication      Past Surgical History:   Procedure Laterality Date   • PACEMAKER INSERTION Left 2012    Medtronic  "Versa VEDR01 implanted by Dr. Lewis.   • ABDOMINAL HYSTERECTOMY TOTAL  2002    tahbso in ileana , not cancerous   • DILATION AND CURETTAGE     • TONSILLECTOMY       Family History   Problem Relation Age of Onset   • Heart Disease Father         CAD     Social History     Socioeconomic History   • Marital status:      Spouse name: Not on file   • Number of children: Not on file   • Years of education: Not on file   • Highest education level: Not on file   Occupational History   • Not on file   Social Needs   • Financial resource strain: Not on file   • Food insecurity     Worry: Not on file     Inability: Not on file   • Transportation needs     Medical: Not on file     Non-medical: Not on file   Tobacco Use   • Smoking status: Never Smoker   • Smokeless tobacco: Never Used   Substance and Sexual Activity   • Alcohol use: Yes   • Drug use: Not on file   • Sexual activity: Not on file   Lifestyle   • Physical activity     Days per week: Not on file     Minutes per session: Not on file   • Stress: Not on file   Relationships   • Social connections     Talks on phone: Not on file     Gets together: Not on file     Attends Jain service: Not on file     Active member of club or organization: Not on file     Attends meetings of clubs or organizations: Not on file     Relationship status: Not on file   • Intimate partner violence     Fear of current or ex partner: Not on file     Emotionally abused: Not on file     Physically abused: Not on file     Forced sexual activity: Not on file   Other Topics Concern   • Not on file   Social History Narrative   • Not on file     Allergies   Allergen Reactions   • Niacin      Bad hot rush, \"on fire\"     Outpatient Encounter Medications as of 2/25/2020   Medication Sig Dispense Refill   • aspirin EC (ECOTRIN) 81 MG Tablet Delayed Response Take 81 mg by mouth every day.     • amLODIPine (NORVASC) 5 MG Tab Take 1 Tab by mouth every day. 30 Tab 11   • hydrALAZINE " "(APRESOLINE) 50 MG Tab Take 1 Tab by mouth 2 Times a Day. 60 Tab 3   • furosemide (LASIX) 20 MG Tab Take 1 Tab by mouth every day. 30 Tab 0   • potassium chloride (KLOR-CON) 8 MEQ tablet Take 1 Tab by mouth every day. 30 Tab 0   • atorvastatin (LIPITOR) 40 MG Tab Take 1 Tab by mouth every day. 90 Tab 3   • clopidogrel (PLAVIX) 75 MG Tab      • latanoprost (XALATAN) 0.005 % Solution      • nitroglycerin (NITROSTAT) 0.4 MG SL Tab Place 1 Tab under tongue as needed for Chest Pain. 25 Tab 11   • VITAMIN D, CHOLECALCIFEROL, PO Take  by mouth.     • [DISCONTINUED] carvedilol (COREG) 6.25 MG Tab Take 1 Tab by mouth 2 times a day, with meals. 60 Tab 11   • [DISCONTINUED] valsartan (DIOVAN) 40 MG Tab Take 2 Tabs by mouth every day. 30 Tab 11   • [DISCONTINUED] valsartan (DIOVAN) 40 MG Tab Take 1 Tab by mouth every day. 30 Tab 11   • [DISCONTINUED] valsartan (DIOVAN) 40 MG Tab Take 1 Tab by mouth every day. 30 Tab 11   • [DISCONTINUED] valsartan (DIOVAN) 40 MG Tab Take 1 Tab by mouth every evening. 30 Tab 11   • [DISCONTINUED] amLODIPine (NORVASC) 5 MG Tab      • [DISCONTINUED] hydrALAZINE (APRESOLINE) 50 MG Tab Take 1 Tab by mouth 2 Times a Day. 60 Tab 3   • [DISCONTINUED] acyclovir (ZOVIRAX) 400 MG tablet        No facility-administered encounter medications on file as of 2/25/2020.      Review of Systems   Constitutional: Negative for malaise/fatigue and weight loss.   Respiratory: Negative for shortness of breath.    Cardiovascular: Positive for leg swelling. Negative for chest pain, palpitations, orthopnea, claudication and PND.   Neurological: Negative for dizziness and weakness.   All other systems reviewed and are negative.       Objective:   /64 (BP Location: Left arm, Patient Position: Sitting, BP Cuff Size: Adult)   Pulse 80   Ht 1.6 m (5' 3\")   Wt 64 kg (141 lb)   SpO2 95%   BMI 24.98 kg/m²     Physical Exam   Constitutional: She is oriented to person, place, and time. She appears well-developed and " well-nourished. No distress.   HENT:   Head: Normocephalic.   Eyes: EOM are normal.   Neck: JVD present.   Cardiovascular: Normal rate, regular rhythm and normal heart sounds.   Pulmonary/Chest: Breath sounds normal. No respiratory distress.   Abdominal: Soft. There is no abdominal tenderness.   Musculoskeletal:         General: Edema (2+BLE) present.   Neurological: She is alert and oriented to person, place, and time.   Skin: Skin is warm and dry.   Psychiatric: She has a normal mood and affect. Her behavior is normal.       Assessment:     1. Essential hypertension, benign  Basic Metabolic Panel    Basic Metabolic Panel   2. Pulmonary HTN (CMS-HCC)     3. Acute on chronic diastolic heart failure (HCC)     4. Paroxysmal atrial fibrillation (HCC)     5. Sick sinus syndrome (HCC)     6. Cardiac pacemaker in situ     7. Localized edema     8. Essential hypertension         Medical Decision Making:  Today's Assessment / Status / Plan:     Diastolic Dysfunction/Pulmonary HTN:  -RVSP on TTE obtained at [facility] on 4/16/2020 was 70 mmHg.  -Increased edema (started before she started the Amlodpine on 2/13/20.  -Weight up almost 9 lbs since 1/31/19.   -Start lasix 20 mg daily with KCL 8 MEQ.  -BMP in one week.   -Daily weights and signs/symptoms of progressive heart failure discussed. Patient will call our office if she gains more than 3lbs/day or 5 lbs/week, has progressive edema and or shortness of breath.     SSS:  -S/P MDT Dual Chamber DDDR PPM placed on 12/21/12.  -Last interrogation on 1/31/20 showed normal device function, batter longevity of 2-5.5 years.    PAF:  -PPM interrogation showed low mode switching of 0.1%.   -No OAC due to recent retinal hemorrhage.   -Continue DAPT with ASA/Plavix.     HTN:  -S/P right renal artery stenting on 1/17/2020.  -BP remains poorly controlled, lasix started as above.    Patient will follow up with Dr. Matt as previously scheduled below or earlier if needed. Encouraged both  patient and her daughter to call our office should any questions or concerns arise in the mean time. Both patient and her daughter understand and agree with the plan of care.       Future Appointments   Date Time Provider Department Center   3/10/2020 10:30 AM G[facility]ATRI [facility] None     Collaborating Provider: Dr. Steve Rojas       Please note that this dictation was created using voice recognition software. I have made every reasonable attempt to correct obvious errors, but I expect that there are errors of grammar and possibly content I did not discover before finalizing the note.

## 2020-02-26 PROBLEM — I50.33 ACUTE ON CHRONIC DIASTOLIC HEART FAILURE (HCC): Status: ACTIVE | Noted: 2020-02-26

## 2020-02-26 ASSESSMENT — ENCOUNTER SYMPTOMS
ORTHOPNEA: 0
WEIGHT LOSS: 0
WEAKNESS: 0
SHORTNESS OF BREATH: 0
CLAUDICATION: 0
DIZZINESS: 0
PND: 0
PALPITATIONS: 0

## 2020-03-05 ENCOUNTER — TELEPHONE (OUTPATIENT)
Dept: CARDIOLOGY | Facility: MEDICAL CENTER | Age: 85
End: 2020-03-05

## 2020-03-05 NOTE — TELEPHONE ENCOUNTER
SANDY to remind patient to complete non-fasting labs before upcoming appt with Dr. Matt on 03/10/2020.

## 2020-03-10 ENCOUNTER — OFFICE VISIT (OUTPATIENT)
Dept: CARDIOLOGY | Facility: MEDICAL CENTER | Age: 85
End: 2020-03-10
Payer: MEDICARE

## 2020-03-10 VITALS
HEART RATE: 61 BPM | OXYGEN SATURATION: 96 % | SYSTOLIC BLOOD PRESSURE: 146 MMHG | HEIGHT: 63 IN | BODY MASS INDEX: 24.13 KG/M2 | DIASTOLIC BLOOD PRESSURE: 62 MMHG | WEIGHT: 136.2 LBS

## 2020-03-10 DIAGNOSIS — I27.20 PULMONARY HTN (HCC): ICD-10-CM

## 2020-03-10 DIAGNOSIS — I49.5 SICK SINUS SYNDROME (HCC): ICD-10-CM

## 2020-03-10 DIAGNOSIS — R60.0 LOCALIZED EDEMA: ICD-10-CM

## 2020-03-10 DIAGNOSIS — Z95.0 CARDIAC PACEMAKER IN SITU: ICD-10-CM

## 2020-03-10 DIAGNOSIS — I10 HTN (HYPERTENSION), MALIGNANT: ICD-10-CM

## 2020-03-10 DIAGNOSIS — Z79.899 HIGH RISK MEDICATION USE: ICD-10-CM

## 2020-03-10 PROCEDURE — 99215 OFFICE O/P EST HI 40 MIN: CPT | Performed by: INTERNAL MEDICINE

## 2020-03-10 RX ORDER — POTASSIUM CHLORIDE 600 MG/1
16 TABLET, FILM COATED, EXTENDED RELEASE ORAL DAILY
Qty: 60 TAB | Refills: 3 | Status: SHIPPED | OUTPATIENT
Start: 2020-03-10 | End: 2020-09-29

## 2020-03-10 RX ORDER — TORSEMIDE 20 MG/1
40 TABLET ORAL DAILY
Qty: 60 TAB | Refills: 3 | Status: SHIPPED | OUTPATIENT
Start: 2020-03-10 | End: 2020-03-31 | Stop reason: SDUPTHER

## 2020-03-10 ASSESSMENT — ENCOUNTER SYMPTOMS
SHORTNESS OF BREATH: 1
PALPITATIONS: 0
DIZZINESS: 0
SENSORY CHANGE: 0
BRUISES/BLEEDS EASILY: 0
COUGH: 0
FEVER: 0
BLURRED VISION: 0
DOUBLE VISION: 0
FALLS: 0
HEADACHES: 0
DEPRESSION: 0
MEMORY LOSS: 0
ABDOMINAL PAIN: 0
MYALGIAS: 0
DIAPHORESIS: 0

## 2020-03-10 ASSESSMENT — FIBROSIS 4 INDEX: FIB4 SCORE: 1.190629431996543688

## 2020-03-10 NOTE — PROGRESS NOTES
Chief Complaint   Patient presents with   • Atrial Fibrillation     F/V        Subjective:   Carissa Marin is a 89 y.o. female who presents today for PAF, SSS s/p PPM, HTN.      Patient was in the hospital at [facility] in 2020 because of hypertensive emergency.  Per report, she underwent renal stenting.  That is why she is on Plavix at this time.  She also had cardiac testing which did not find any abnormalities per report.  We are still obtaining her records from [facility] at this time.    Past Medical History:   Diagnosis Date   • Abdominal pain, other specified site     pelvic   • ASTHMA    • Atrial fibrillation (HCC)    • Colon polyps    • Herpes simplex without mention of complication    • Hyperglycemia    • Hyperlipidemia    • Hypertension    • Lump or mass in breast    • Need for prophylactic hormone replacement therapy (postmenopausal)    • Nonspecific abnormal results of thyroid function study    • Osteoarthrosis, unspecified whether generalized or localized, lower leg    • Pain in joint, shoulder region     right shoulder   • Palpitations    • Pneumonia, organism unspecified(486)    • Pulmonary hypertension (HCC)    • Routine general medical examination at a health care facility    • Sinus bradycardia    • Unspecified spontaneous  without mention of complication      Past Surgical History:   Procedure Laterality Date   • PACEMAKER INSERTION Left 2012    Medtronic Versa [id number] implanted by Dr. Lewis.   • ABDOMINAL HYSTERECTOMY TOTAL      tahbso in [place] , not cancerous   • DILATION AND CURETTAGE     • TONSILLECTOMY       Family History   Problem Relation Age of Onset   • Heart Disease Father         CAD     Social History     Socioeconomic History   • Marital status:      Spouse name: Not on file   • Number of children: Not on file   • Years of education: Not on file   • Highest education level: Not on file   Occupational History   • Not on file  "  Social Needs   • Financial resource strain: Not on file   • Food insecurity     Worry: Not on file     Inability: Not on file   • Transportation needs     Medical: Not on file     Non-medical: Not on file   Tobacco Use   • Smoking status: Never Smoker   • Smokeless tobacco: Never Used   Substance and Sexual Activity   • Alcohol use: Yes   • Drug use: Not on file   • Sexual activity: Not on file   Lifestyle   • Physical activity     Days per week: Not on file     Minutes per session: Not on file   • Stress: Not on file   Relationships   • Social connections     Talks on phone: Not on file     Gets together: Not on file     Attends Hoahaoism service: Not on file     Active member of club or organization: Not on file     Attends meetings of clubs or organizations: Not on file     Relationship status: Not on file   • Intimate partner violence     Fear of current or ex partner: Not on file     Emotionally abused: Not on file     Physically abused: Not on file     Forced sexual activity: Not on file   Other Topics Concern   • Not on file   Social History Narrative   • Not on file     Allergies   Allergen Reactions   • Niacin      Bad hot rush, \"on fire\"     Outpatient Encounter Medications as of 3/10/2020   Medication Sig Dispense Refill   • torsemide (DEMADEX) 20 MG Tab Take 2 Tabs by mouth every day. 60 Tab 3   • potassium chloride (KLOR-CON) 8 MEQ tablet Take 2 Tabs by mouth every day. 60 Tab 3   • aspirin EC (ECOTRIN) 81 MG Tablet Delayed Response Take 81 mg by mouth every day.     • amLODIPine (NORVASC) 5 MG Tab Take 1 Tab by mouth every day. 30 Tab 11   • hydrALAZINE (APRESOLINE) 50 MG Tab Take 1 Tab by mouth 2 Times a Day. 60 Tab 3   • atorvastatin (LIPITOR) 40 MG Tab Take 1 Tab by mouth every day. 90 Tab 3   • clopidogrel (PLAVIX) 75 MG Tab      • latanoprost (XALATAN) 0.005 % Solution      • nitroglycerin (NITROSTAT) 0.4 MG SL Tab Place 1 Tab under tongue as needed for Chest Pain. 25 Tab 11   • VITAMIN D, " "CHOLECALCIFEROL, PO Take  by mouth.     • [DISCONTINUED] furosemide (LASIX) 20 MG Tab Take 1 Tab by mouth every day. 30 Tab 0   • [DISCONTINUED] potassium chloride (KLOR-CON) 8 MEQ tablet Take 1 Tab by mouth every day. 30 Tab 0     No facility-administered encounter medications on file as of 3/10/2020.      Review of Systems   Constitutional: Negative for diaphoresis and fever.   HENT: Negative for nosebleeds.    Eyes: Negative for blurred vision and double vision.   Respiratory: Positive for shortness of breath. Negative for cough.    Cardiovascular: Positive for leg swelling. Negative for chest pain and palpitations.   Gastrointestinal: Negative for abdominal pain.   Genitourinary: Negative for dysuria and frequency.   Musculoskeletal: Negative for falls and myalgias.   Skin: Negative for rash.   Neurological: Negative for dizziness, sensory change and headaches.   Endo/Heme/Allergies: Does not bruise/bleed easily.   Psychiatric/Behavioral: Negative for depression and memory loss.        Objective:   /62 (BP Location: Right arm, Patient Position: Sitting, BP Cuff Size: Adult)   Pulse 61   Ht 1.6 m (5' 3\")   Wt 61.8 kg (136 lb 3.2 oz)   SpO2 96%   BMI 24.13 kg/m²     Physical Exam   Constitutional: She is oriented to person, place, and time. She appears distressed.   HENT:   Head: Normocephalic and atraumatic.   Right Ear: External ear normal.   Left Ear: External ear normal.   Eyes: Right eye exhibits no discharge. Left eye exhibits no discharge.   Neck: JVD present. No thyromegaly present.   Cardiovascular: Normal rate, regular rhythm and intact distal pulses. Exam reveals no friction rub.   No murmur heard.  Pulmonary/Chest: No respiratory distress. She has rales.   Abdominal: Bowel sounds are normal. She exhibits no distension. There is no abdominal tenderness.   Musculoskeletal:         General: Edema present. No tenderness.   Neurological: She is alert and oriented to person, place, and time. No " cranial nerve deficit.   Skin: Skin is warm and dry. She is not diaphoretic.   Psychiatric: She has a normal mood and affect. Her behavior is normal.   Nursing note and vitals reviewed.      Assessment:     1. High risk medication use  Basic Metabolic Panel   2. Pulmonary HTN (CMS-HCC)     3. Sick sinus syndrome (HCC)     4. Cardiac pacemaker in situ     5. Localized edema     6. HTN (hypertension), malignant         Medical Decision Making:  Today's Assessment / Status / Plan:   Patient is still volume up with significant legs swelling. Weight is 136 lbs. Lost 5 lbs.    Will stop Lasix.    Will start Torsemide 40 mg once a day for better diuresis.    SSS s/p PPM:  Pacemaker is working properly, I personally interpreted the result.     Paroxysmal atrial fibrillation:  Continue Asa and Clopidogrel.  No anticoagulation to avoid triple therapy.     Hypertension:  We will continue hydralazine 50 mg p.o. twice a day along with amlodipine 5 mg p.o. once a day.     Renal stent:  Continue aspirin and Plavix.         Return in 2 weeks with blood test.

## 2020-03-11 ENCOUNTER — TELEPHONE (OUTPATIENT)
Dept: CARDIOLOGY | Facility: MEDICAL CENTER | Age: 85
End: 2020-03-11

## 2020-03-26 ENCOUNTER — TELEPHONE (OUTPATIENT)
Dept: CARDIOLOGY | Facility: MEDICAL CENTER | Age: 85
End: 2020-03-26

## 2020-03-26 NOTE — TELEPHONE ENCOUNTER
TT    Pt's daughter Dominique would like pt to stop diuretic, her swelling is better. She can be reached at [phone].

## 2020-03-26 NOTE — TELEPHONE ENCOUNTER
Returned call. Dominique said that pt's swelling has improved and she is wondering when she can stop torsemide, or what the future plan is for a diuretic. Advised that this decision would be best made at her appt on Monday, 3/30/20. She is wondering if it's safe for pt to leave her facility, [facility] in [place]. Explained that we are going to virtual visits and that pt should receive a call prior to appt to confirm that she is ok with this. Dominique recommends that we get in contact with the director of the memory care unit, Rachel Matthews, at [phone] to make sure everything is arranged for virtual visit, because she doesn't trust pt to figure out technology. Added this info to appt notes.

## 2020-03-31 ENCOUNTER — OFFICE VISIT (OUTPATIENT)
Dept: CARDIOLOGY | Facility: MEDICAL CENTER | Age: 85
End: 2020-03-31
Payer: MEDICARE

## 2020-03-31 VITALS
WEIGHT: 128.6 LBS | SYSTOLIC BLOOD PRESSURE: 137 MMHG | HEART RATE: 61 BPM | BODY MASS INDEX: 22.79 KG/M2 | HEIGHT: 63 IN | DIASTOLIC BLOOD PRESSURE: 62 MMHG

## 2020-03-31 DIAGNOSIS — R60.0 LOCALIZED EDEMA: ICD-10-CM

## 2020-03-31 DIAGNOSIS — I27.20 PULMONARY HTN (HCC): ICD-10-CM

## 2020-03-31 DIAGNOSIS — I49.5 SICK SINUS SYNDROME (HCC): ICD-10-CM

## 2020-03-31 DIAGNOSIS — Z95.0 CARDIAC PACEMAKER IN SITU: ICD-10-CM

## 2020-03-31 DIAGNOSIS — Z79.899 HIGH RISK MEDICATION USE: ICD-10-CM

## 2020-03-31 DIAGNOSIS — I10 HTN (HYPERTENSION), MALIGNANT: ICD-10-CM

## 2020-03-31 PROCEDURE — 99214 OFFICE O/P EST MOD 30 MIN: CPT | Mod: 95,CR | Performed by: INTERNAL MEDICINE

## 2020-03-31 RX ORDER — TORSEMIDE 20 MG/1
20 TABLET ORAL DAILY
Qty: 30 TAB | Refills: 3 | Status: SHIPPED | OUTPATIENT
Start: 2020-03-31 | End: 2020-09-29

## 2020-03-31 ASSESSMENT — ENCOUNTER SYMPTOMS
CLAUDICATION: 0
EYE DISCHARGE: 0
SENSORY CHANGE: 0
DOUBLE VISION: 0
NAUSEA: 0
ORTHOPNEA: 0
DIZZINESS: 0
DEPRESSION: 0
PND: 0
FEVER: 0
CHILLS: 0
ABDOMINAL PAIN: 0
WEIGHT LOSS: 0
MYALGIAS: 0
EYE PAIN: 0
BLOOD IN STOOL: 0
PALPITATIONS: 0
HEADACHES: 0
HALLUCINATIONS: 0
BLURRED VISION: 0
SPEECH CHANGE: 0
SHORTNESS OF BREATH: 0
LOSS OF CONSCIOUSNESS: 0
FALLS: 0
VOMITING: 0
BRUISES/BLEEDS EASILY: 0
COUGH: 0

## 2020-03-31 ASSESSMENT — FIBROSIS 4 INDEX: FIB4 SCORE: 1.190629431996543688

## 2020-03-31 NOTE — PROGRESS NOTES
Chief Complaint   Patient presents with   • Atrial Fibrillation       Subjective:   Carissa Marin is a 89 y.o. female who presents today for PAF, SSS s/p PPM, HTN.      Patient was in the hospital at [facility] in 2020 because of hypertensive emergency.  Per report, she underwent renal stenting.  That is why she is on Plavix at this time.  She also had cardiac testing which did not find any abnormalities per report.  We are still obtaining her records from [facility] at this time.    She is feeling better. Staying in home.    Past Medical History:   Diagnosis Date   • Abdominal pain, other specified site     pelvic   • ASTHMA    • Atrial fibrillation (HCC)    • Colon polyps    • Herpes simplex without mention of complication    • Hyperglycemia    • Hyperlipidemia    • Hypertension    • Lump or mass in breast    • Need for prophylactic hormone replacement therapy (postmenopausal)    • Nonspecific abnormal results of thyroid function study    • Osteoarthrosis, unspecified whether generalized or localized, lower leg    • Pain in joint, shoulder region     right shoulder   • Palpitations    • Pneumonia, organism unspecified(486)    • Pulmonary hypertension (HCC)    • Routine general medical examination at a health care facility    • Sinus bradycardia    • Unspecified spontaneous  without mention of complication      Past Surgical History:   Procedure Laterality Date   • PACEMAKER INSERTION Left 2012    Medtronic Versa [id number] implanted by Dr. Lewis.   • ABDOMINAL HYSTERECTOMY TOTAL      tahbso in [place] , not cancerous   • DILATION AND CURETTAGE     • TONSILLECTOMY       Family History   Problem Relation Age of Onset   • Heart Disease Father         CAD     Social History     Socioeconomic History   • Marital status:      Spouse name: Not on file   • Number of children: Not on file   • Years of education: Not on file   • Highest education level: Not on file  "  Occupational History   • Not on file   Social Needs   • Financial resource strain: Not on file   • Food insecurity     Worry: Not on file     Inability: Not on file   • Transportation needs     Medical: Not on file     Non-medical: Not on file   Tobacco Use   • Smoking status: Never Smoker   • Smokeless tobacco: Never Used   Substance and Sexual Activity   • Alcohol use: Yes   • Drug use: Not on file   • Sexual activity: Not on file   Lifestyle   • Physical activity     Days per week: Not on file     Minutes per session: Not on file   • Stress: Not on file   Relationships   • Social connections     Talks on phone: Not on file     Gets together: Not on file     Attends Synagogue service: Not on file     Active member of club or organization: Not on file     Attends meetings of clubs or organizations: Not on file     Relationship status: Not on file   • Intimate partner violence     Fear of current or ex partner: Not on file     Emotionally abused: Not on file     Physically abused: Not on file     Forced sexual activity: Not on file   Other Topics Concern   • Not on file   Social History Narrative   • Not on file     Allergies   Allergen Reactions   • Niacin      Bad hot rush, \"on fire\"     Outpatient Encounter Medications as of 3/31/2020   Medication Sig Dispense Refill   • Carboxymethylcellulose Sodium (EYE DROPS OP) by Ophthalmic route.     • torsemide (DEMADEX) 20 MG Tab Take 2 Tabs by mouth every day. 60 Tab 3   • potassium chloride (KLOR-CON) 8 MEQ tablet Take 2 Tabs by mouth every day. 60 Tab 3   • aspirin EC (ECOTRIN) 81 MG Tablet Delayed Response Take 81 mg by mouth every day.     • amLODIPine (NORVASC) 5 MG Tab Take 1 Tab by mouth every day. 30 Tab 11   • hydrALAZINE (APRESOLINE) 50 MG Tab Take 1 Tab by mouth 2 Times a Day. 60 Tab 3   • atorvastatin (LIPITOR) 40 MG Tab Take 1 Tab by mouth every day. 90 Tab 3   • clopidogrel (PLAVIX) 75 MG Tab      • latanoprost (XALATAN) 0.005 % Solution      • " "nitroglycerin (NITROSTAT) 0.4 MG SL Tab Place 1 Tab under tongue as needed for Chest Pain. (Patient not taking: Reported on 3/31/2020) 25 Tab 11   • VITAMIN D, CHOLECALCIFEROL, PO Take  by mouth.       No facility-administered encounter medications on file as of 3/31/2020.      Review of Systems   Constitutional: Negative for chills, fever, malaise/fatigue and weight loss.   HENT: Negative for ear discharge, ear pain, hearing loss and nosebleeds.    Eyes: Negative for blurred vision, double vision, pain and discharge.   Respiratory: Negative for cough and shortness of breath.    Cardiovascular: Negative for chest pain, palpitations, orthopnea, claudication, leg swelling and PND.   Gastrointestinal: Negative for abdominal pain, blood in stool, melena, nausea and vomiting.   Genitourinary: Negative for dysuria and hematuria.   Musculoskeletal: Negative for falls, joint pain and myalgias.   Skin: Negative for itching and rash.   Neurological: Negative for dizziness, sensory change, speech change, loss of consciousness and headaches.   Endo/Heme/Allergies: Negative for environmental allergies. Does not bruise/bleed easily.   Psychiatric/Behavioral: Negative for depression, hallucinations and suicidal ideas.        Objective:   /62 (BP Location: Left arm, Patient Position: Sitting, BP Cuff Size: Adult)   Pulse 61   Ht 1.6 m (5' 3\")   Wt 58.3 kg (128 lb 9.6 oz)   BMI 22.78 kg/m²     Physical Exam   Constitutional: No distress.   HENT:   Head: Normocephalic and atraumatic.   Eyes: Right eye exhibits no discharge. Left eye exhibits no discharge.   Pulmonary/Chest: Breath sounds normal. No respiratory distress.   Abdominal: Bowel sounds are normal. She exhibits no distension. There is no abdominal tenderness.   Musculoskeletal:         General: Edema present. No tenderness.      Comments: Better swelling control.   Neurological: She is alert. No cranial nerve deficit.   Psychiatric: She has a normal mood and " affect. Her behavior is normal.   Nursing note and vitals reviewed.      Assessment:     1. Pulmonary HTN (CMS-HCC)     2. Sick sinus syndrome (HCC)     3. Cardiac pacemaker in situ     4. Localized edema     5. HTN (hypertension), malignant     6. High risk medication use         Medical Decision Making:  Today's Assessment / Status / Plan:   Patient is still volume up with significant legs swelling. Weight is 128 lbs. Lost additional 5 lbs.     Will reduce Torsemide to 20 mg once a day.     SSS s/p PPM:  Pacemaker is working properly, I personally interpreted the result.     Paroxysmal atrial fibrillation:  Continue Asa and Clopidogrel.  No anticoagulation to avoid triple therapy.     Hypertension:  We will continue hydralazine 50 mg p.o. twice a day along with amlodipine 5 mg p.o. once a day.     Renal stent:  Continue aspirin and Plavix.    Of note, this is a telemedicine visit via Zoom due to current COVID pandemic.

## 2020-04-14 DIAGNOSIS — I10 ESSENTIAL HYPERTENSION: Primary | Chronic | ICD-10-CM

## 2020-04-14 RX ORDER — AMLODIPINE BESYLATE 5 MG/1
5 TABLET ORAL DAILY
Qty: 30 TAB | Refills: 11 | Status: SHIPPED | OUTPATIENT
Start: 2020-04-14 | End: 2020-09-29 | Stop reason: SDUPTHER

## 2020-04-21 ENCOUNTER — TELEPHONE (OUTPATIENT)
Dept: CARDIOLOGY | Facility: MEDICAL CENTER | Age: 85
End: 2020-04-21

## 2020-04-21 DIAGNOSIS — I50.33 ACUTE ON CHRONIC DIASTOLIC HEART FAILURE (HCC): ICD-10-CM

## 2020-04-21 DIAGNOSIS — R60.0 LOCALIZED EDEMA: ICD-10-CM

## 2020-04-21 DIAGNOSIS — I10 ESSENTIAL HYPERTENSION: ICD-10-CM

## 2020-04-21 NOTE — TELEPHONE ENCOUNTER
Amaya Matt M.D.  You 19 minutes ago (10:56 AM)      BMP is ok.     Order placed for BMP. Returned Dominique's call. She would like the order faxed to pt's facility[facility]. Order faxed to [phone]. Receipt confirmed.

## 2020-04-21 NOTE — TELEPHONE ENCOUNTER
TT/machelle    Pt's daughter Dominique Loco calling to obtain lab order for blood order to determine kidney function level as a result of how pt's diuretics are working.    Please call Dominique

## 2020-08-31 ENCOUNTER — TELEPHONE (OUTPATIENT)
Dept: CARDIOLOGY | Facility: MEDICAL CENTER | Age: 85
End: 2020-08-31

## 2020-08-31 NOTE — TELEPHONE ENCOUNTER
Returned call. Dominique just wanted to provide us with an update. She moved pt out of the memory care facility, and she is now staying 5 days a week with Dominique, and 2 days a week with Dominique's sister. She reports that pt is no longer taking torsemide or potassium, and she thinks it's possible that pt's PCP stopped this. She has had no problems with fluid overload such as SOB, weight gain, or edema. She has been feeling well overall. BP has been 120s/50s-60s, HR 60s. She explains that pt is not currently wanting any medical treatment, so she is trying to minimize the amount of pills she gets. She is still taking clopidogrel, amlodipine, and hydralazine. She wants to schedule pt for a f/u and a pacer check. Scheduled for appt with SUMAN on 9/29 and device check on 10/9.

## 2020-08-31 NOTE — TELEPHONE ENCOUNTER
TT/machelle Robin's daughter, Dominique Loco, calling to review medications and to ask for timing advice on pacer checks. Please call Dominique

## 2020-08-31 NOTE — TELEPHONE ENCOUNTER
Dominique called back. Please try them again. They will keep the phone near by.    Thank you,  Alessandra CHRISTINA

## 2020-09-23 ENCOUNTER — TELEPHONE (OUTPATIENT)
Dept: CARDIOLOGY | Facility: MEDICAL CENTER | Age: 85
End: 2020-09-23

## 2020-09-29 ENCOUNTER — OFFICE VISIT (OUTPATIENT)
Dept: CARDIOLOGY | Facility: MEDICAL CENTER | Age: 85
End: 2020-09-29
Payer: MEDICARE

## 2020-09-29 VITALS
DIASTOLIC BLOOD PRESSURE: 70 MMHG | BODY MASS INDEX: 23.8 KG/M2 | WEIGHT: 134.3 LBS | RESPIRATION RATE: 16 BRPM | HEIGHT: 63 IN | HEART RATE: 61 BPM | SYSTOLIC BLOOD PRESSURE: 136 MMHG | OXYGEN SATURATION: 95 %

## 2020-09-29 DIAGNOSIS — Z95.0 CARDIAC PACEMAKER IN SITU: ICD-10-CM

## 2020-09-29 DIAGNOSIS — I49.5 SICK SINUS SYNDROME (HCC): ICD-10-CM

## 2020-09-29 DIAGNOSIS — I48.0 PAROXYSMAL ATRIAL FIBRILLATION (HCC): ICD-10-CM

## 2020-09-29 DIAGNOSIS — Z79.899 HIGH RISK MEDICATION USE: ICD-10-CM

## 2020-09-29 DIAGNOSIS — Z98.890 HISTORY OF STENT INSERTION OF RENAL ARTERY: ICD-10-CM

## 2020-09-29 DIAGNOSIS — I10 ESSENTIAL HYPERTENSION: ICD-10-CM

## 2020-09-29 PROCEDURE — 99214 OFFICE O/P EST MOD 30 MIN: CPT | Performed by: NURSE PRACTITIONER

## 2020-09-29 RX ORDER — CLOPIDOGREL BISULFATE 75 MG/1
75 TABLET ORAL DAILY
Qty: 90 TAB | Refills: 3 | Status: SHIPPED | OUTPATIENT
Start: 2020-09-29

## 2020-09-29 RX ORDER — AMLODIPINE BESYLATE 5 MG/1
5 TABLET ORAL DAILY
Qty: 90 TAB | Refills: 3 | Status: SHIPPED | OUTPATIENT
Start: 2020-09-29 | End: 2021-09-20

## 2020-09-29 RX ORDER — HYDRALAZINE HYDROCHLORIDE 50 MG/1
50 TABLET, FILM COATED ORAL 2 TIMES DAILY
Qty: 180 TAB | Refills: 3 | Status: SHIPPED | OUTPATIENT
Start: 2020-09-29 | End: 2021-08-25

## 2020-09-29 ASSESSMENT — ENCOUNTER SYMPTOMS
SHORTNESS OF BREATH: 0
ROS SKIN COMMENTS: BRUISING
CLAUDICATION: 0
PALPITATIONS: 0
PND: 0
COUGH: 0
DIZZINESS: 0
MYALGIAS: 0
FEVER: 0
ABDOMINAL PAIN: 0
ORTHOPNEA: 0

## 2020-09-29 ASSESSMENT — FIBROSIS 4 INDEX: FIB4 SCORE: 1.190629431996543688

## 2020-09-29 NOTE — PROGRESS NOTES
Chief Complaint   Patient presents with   • Hypertension       Subjective:   Carissa Marin is a 89 y.o. female who presents today for follow up for her paroxysmal atrial fibrillation, hypertension, pacemaker with her daughter, Dominique.    She is a patient of Dr. Matt.  She was last seen in clinic on 3/31/2020 through a virtual visit.  During her last visit, torsemide was reduced to 20 mg once a day.    Since that visit, patient did have a follow-up with her primary care who discontinued her torsemide, potassium and atorvastatin.    Patient also was taken out of the [facility] and is now staying with her daughters.    She is doing well, was up and around in their home.    She denies any chest pain, palpitations, orthopnea, PND or edema.  She does report a rare episode of dizziness if standing too quickly or moving around quickly.    She also is reporting some bruising with her hands if she bumps into anything.    Her blood pressure is checked weekly at home and is around 130, systolic.    Additonally, patient has the following medical problems:    -History of right renal artery stenosis, with a stent placement in January 17, 2020 at [facility].    -Hypertension    -Paroxysmal A. fib, taken off Xarelto during her hospitalization in January 2020    -SSS, s/p PPM    Past Medical History:   Diagnosis Date   • Abdominal pain, other specified site     pelvic   • ASTHMA    • Atrial fibrillation (HCC)    • Colon polyps    • Herpes simplex without mention of complication    • Hyperglycemia    • Hyperlipidemia    • Hypertension    • Lump or mass in breast    • Need for prophylactic hormone replacement therapy (postmenopausal)    • Nonspecific abnormal results of thyroid function study    • Osteoarthrosis, unspecified whether generalized or localized, lower leg    • Pain in joint, shoulder region     right shoulder   • Palpitations    • Pneumonia, organism unspecified(486)    • Pulmonary hypertension  ([facility])    • Renal artery stenosis ([facility]) 2020    had stent placement at [facility] of right Renal Artery Stenosis   • Routine general medical examination at a health care facility    • Sinus bradycardia    • Unspecified spontaneous  without mention of complication      Past Surgical History:   Procedure Laterality Date   • PACEMAKER INSERTION Left 2012    Medtronic Versa [id number] implanted by Dr. Lewis.   • ABDOMINAL HYSTERECTOMY TOTAL      tahbso in ileana , not cancerous   • DILATION AND CURETTAGE     • TONSILLECTOMY       Family History   Problem Relation Age of Onset   • Heart Disease Father         CAD   • Osteoporosis Mother    • Alcohol abuse Brother    • Cancer Brother         skin     Social History     Socioeconomic History   • Marital status:      Spouse name: Not on file   • Number of children: Not on file   • Years of education: Not on file   • Highest education level: Not on file   Occupational History   • Not on file   Social Needs   • Financial resource strain: Not on file   • Food insecurity     Worry: Not on file     Inability: Not on file   • Transportation needs     Medical: Not on file     Non-medical: Not on file   Tobacco Use   • Smoking status: Former Smoker     Packs/day: 1.00     Years: 30.00     Pack years: 30.00     Types: Cigarettes     Quit date:      Years since quittin.7   • Smokeless tobacco: Never Used   Substance and Sexual Activity   • Alcohol use: Yes   • Drug use: Not on file   • Sexual activity: Not on file   Lifestyle   • Physical activity     Days per week: Not on file     Minutes per session: Not on file   • Stress: Not on file   Relationships   • Social connections     Talks on phone: Not on file     Gets together: Not on file     Attends [employer] service: Not on file     Active member of club or organization: Not on file     Attends meetings of clubs or organizations: Not on file     Relationship status: Not on file   • Intimate  "partner violence     Fear of current or ex partner: Not on file     Emotionally abused: Not on file     Physically abused: Not on file     Forced sexual activity: Not on file   Other Topics Concern   • Not on file   Social History Narrative   • Not on file     Allergies   Allergen Reactions   • Niacin      Bad hot rush, \"on fire\"     Outpatient Encounter Medications as of 9/29/2020   Medication Sig Dispense Refill   • hydrALAZINE (APRESOLINE) 50 MG Tab Take 1 Tab by mouth 2 Times a Day. 180 Tab 3   • clopidogrel (PLAVIX) 75 MG Tab Take 1 Tab by mouth every day. 90 Tab 3   • amLODIPine (NORVASC) 5 MG Tab Take 1 Tab by mouth every day. 90 Tab 3   • aspirin EC (ECOTRIN) 81 MG Tablet Delayed Response Take 81 mg by mouth every day.     • latanoprost (XALATAN) 0.005 % Solution      • [DISCONTINUED] amLODIPine (NORVASC) 5 MG Tab Take 1 Tab by mouth every day. 30 Tab 11   • [DISCONTINUED] Carboxymethylcellulose Sodium (EYE DROPS OP) by Ophthalmic route.     • [DISCONTINUED] torsemide (DEMADEX) 20 MG Tab Take 1 Tab by mouth every day. (Patient not taking: Reported on 9/29/2020) 30 Tab 3   • [DISCONTINUED] potassium chloride (KLOR-CON) 8 MEQ tablet Take 2 Tabs by mouth every day. (Patient not taking: Reported on 9/29/2020) 60 Tab 3   • [DISCONTINUED] hydrALAZINE (APRESOLINE) 50 MG Tab Take 1 Tab by mouth 2 Times a Day. 60 Tab 3   • [DISCONTINUED] atorvastatin (LIPITOR) 40 MG Tab Take 1 Tab by mouth every day. (Patient not taking: Reported on 9/29/2020) 90 Tab 3   • [DISCONTINUED] clopidogrel (PLAVIX) 75 MG Tab      • [DISCONTINUED] nitroglycerin (NITROSTAT) 0.4 MG SL Tab Place 1 Tab under tongue as needed for Chest Pain. (Patient not taking: Reported on 3/31/2020) 25 Tab 11   • [DISCONTINUED] VITAMIN D, CHOLECALCIFEROL, PO Take  by mouth.       No facility-administered encounter medications on file as of 9/29/2020.      Review of Systems   Constitutional: Negative for fever and malaise/fatigue.   Respiratory: Negative for " "cough and shortness of breath.    Cardiovascular: Negative for chest pain, palpitations, orthopnea, claudication, leg swelling and PND.   Gastrointestinal: Negative for abdominal pain.   Musculoskeletal: Negative for myalgias.   Skin:        bruising   Neurological: Negative for dizziness.   All other systems reviewed and are negative.       Objective:   /70 (BP Location: Left arm, Patient Position: Sitting, BP Cuff Size: Adult)   Pulse 61   Resp 16   Ht 1.6 m (5' 3\")   Wt 60.9 kg (134 lb 4.8 oz)   SpO2 95%   BMI 23.79 kg/m²     Physical Exam   Constitutional: She is oriented to person, place, and time. She appears well-developed and well-nourished.   HENT:   Head: Normocephalic and atraumatic.   Eyes: Pupils are equal, round, and reactive to light. EOM are normal.   Neck: Normal range of motion. Neck supple. No JVD present.   Cardiovascular: Normal rate, regular rhythm and normal heart sounds.   Pulmonary/Chest: Effort normal and breath sounds normal. No respiratory distress. She has no wheezes. She has no rales.   Abdominal: Soft. Bowel sounds are normal.   Musculoskeletal:         General: No edema.   Neurological: She is alert and oriented to person, place, and time.   Skin: Skin is warm and dry.   Psychiatric: She has a normal mood and affect. Her behavior is normal.   Vitals reviewed.    Lab Results   Component Value Date/Time    CHOLSTRLTOT 153 09/21/2018 09:10 AM    LDL 80 09/21/2018 09:10 AM    HDL 61 09/21/2018 09:10 AM    TRIGLYCERIDE 63 09/21/2018 09:10 AM       Lab Results   Component Value Date/Time    SODIUM 142 03/25/2020 10:21 AM    POTASSIUM 3.9 03/25/2020 10:21 AM    CHLORIDE 104 03/25/2020 10:21 AM    CO2 31 03/25/2020 10:21 AM    GLUCOSE 119 (H) 03/25/2020 10:21 AM    BUN 31 (H) 03/25/2020 10:21 AM    CREATININE 1.3 (H) 03/25/2020 10:21 AM     Lab Results   Component Value Date/Time    ALKPHOSPHAT 83 07/18/2019 01:55 PM    ASTSGOT 15 07/18/2019 01:55 PM    ALTSGPT 17 07/18/2019 01:55 " PM    TBILIRUBIN 0.3 07/18/2019 01:55 PM        Assessment:     1. Essential hypertension  Comp Metabolic Panel    amLODIPine (NORVASC) 5 MG Tab   2. High risk medication use  Comp Metabolic Panel   3. Sick sinus syndrome (HCC)     4. Cardiac pacemaker in situ     5. Paroxysmal atrial fibrillation (HCC)     6. History of stent insertion of renal artery         Medical Decision Making:  Today's Assessment / Status / Plan:   1.  Hypertension: Stable  -Continue hydralazine 50 mg twice a day  -Continue amlodipine 5 mg daily    2.  Paroxysmal A. Fib:  -Last device check showed 0.1% episodes of A. fib, patient has an upcoming device check in October  -Reintroduced Xarelto/other OAC for her A. fib and stroke risk.  -Patient and daughter okay with patient just taking aspirin and Plavix  -Looks like patient had been taken off of Xarelto due to retinal hemorrhage    3. SSS, s/p PPM:   -Next device check 10/9/2020    4.  Right renal stenosis, s/p Stent on 1/17/2020:  -Continue aspirin 81 mg daily  -Continue clopidogrel 75 mg daily    Patient to have CMP done at some point, no lipid panel as she was taken off her atorvastatin by her PCP.    Patient's daughter will fax over to our office for advanced directive and her POLST to put into her medical record.    FU in clinic in 4 months with Dr. Matt. Sooner if needed.    Patient verbalizes understanding and agrees with the plan of care.     PLEASE NOTE: This Note was created using voice recognition Software. I have made every reasonable attempt to correct obvious errors, but I expect that there are errors of grammar and possibly content that I did not discover before finalizing the note

## 2020-10-16 ENCOUNTER — NON-PROVIDER VISIT (OUTPATIENT)
Dept: CARDIOLOGY | Facility: MEDICAL CENTER | Age: 85
End: 2020-10-16
Payer: MEDICARE

## 2020-10-16 DIAGNOSIS — Z95.0 CARDIAC PACEMAKER IN SITU: ICD-10-CM

## 2020-10-16 PROCEDURE — 93280 PM DEVICE PROGR EVAL DUAL: CPT | Performed by: INTERNAL MEDICINE

## 2021-02-09 ENCOUNTER — OFFICE VISIT (OUTPATIENT)
Dept: CARDIOLOGY | Facility: MEDICAL CENTER | Age: 86
End: 2021-02-09
Payer: MEDICARE

## 2021-02-09 VITALS
OXYGEN SATURATION: 97 % | SYSTOLIC BLOOD PRESSURE: 162 MMHG | HEART RATE: 61 BPM | BODY MASS INDEX: 23.81 KG/M2 | DIASTOLIC BLOOD PRESSURE: 78 MMHG | RESPIRATION RATE: 15 BRPM | WEIGHT: 134.4 LBS | HEIGHT: 63 IN

## 2021-02-09 DIAGNOSIS — I10 HTN (HYPERTENSION), MALIGNANT: ICD-10-CM

## 2021-02-09 DIAGNOSIS — I49.5 SICK SINUS SYNDROME (HCC): ICD-10-CM

## 2021-02-09 DIAGNOSIS — I10 ESSENTIAL HYPERTENSION: ICD-10-CM

## 2021-02-09 DIAGNOSIS — E78.2 MIXED HYPERLIPIDEMIA: ICD-10-CM

## 2021-02-09 DIAGNOSIS — Z98.890 HISTORY OF STENT INSERTION OF RENAL ARTERY: ICD-10-CM

## 2021-02-09 DIAGNOSIS — Z79.899 HIGH RISK MEDICATION USE: ICD-10-CM

## 2021-02-09 DIAGNOSIS — I48.0 PAROXYSMAL ATRIAL FIBRILLATION (HCC): ICD-10-CM

## 2021-02-09 DIAGNOSIS — Z95.0 CARDIAC PACEMAKER IN SITU: ICD-10-CM

## 2021-02-09 PROCEDURE — 99214 OFFICE O/P EST MOD 30 MIN: CPT | Performed by: NURSE PRACTITIONER

## 2021-02-09 ASSESSMENT — ENCOUNTER SYMPTOMS
ORTHOPNEA: 0
MYALGIAS: 0
COUGH: 0
PND: 0
FEVER: 0
ROS SKIN COMMENTS: BRUISING
ABDOMINAL PAIN: 0
SHORTNESS OF BREATH: 0
DIZZINESS: 1
PALPITATIONS: 0
CLAUDICATION: 0

## 2021-02-09 ASSESSMENT — FIBROSIS 4 INDEX: FIB4 SCORE: 1.33

## 2021-02-10 NOTE — PROGRESS NOTES
Chief Complaint   Patient presents with   • Hypertension       Subjective:   Carissa Marin is a 89 y.o. female who presents today for follow up for her paroxysmal atrial fibrillation, hypertension, pacemaker with her daughter, Dominique.    She is a patient of Dr. Matt.  She was last seen in clinic on 9/29/2020. No changes were made during that visit.     Patient reports doing fairly well.  She only reports occasional dizziness with standing.  She states she does not notice too much shortness of breath, but unsure if it is related to mask use.    Patient denies chest pain, palpitations, orthopnea, PND, edema.    She is not weighing herself regularly at home.    Her daughter Dominique has not been monitoring her blood pressure regularly.    She does continue to have bruising.      Additonally, patient has the following medical problems:    -History of right renal artery stenosis, with a stent placement in January 17, 2020 at [facility].    -Hypertension    -Paroxysmal A. fib, taken off Xarelto during her hospitalization in January 2020    -SSS, s/p PPM    Past Medical History:   Diagnosis Date   • Abdominal pain, other specified site     pelvic   • ASTHMA    • Atrial fibrillation (HCC)    • Colon polyps    • Herpes simplex without mention of complication    • Hyperglycemia    • Hyperlipidemia    • Hypertension    • Lump or mass in breast    • Need for prophylactic hormone replacement therapy (postmenopausal)    • Nonspecific abnormal results of thyroid function study    • Osteoarthrosis, unspecified whether generalized or localized, lower leg    • Pain in joint, shoulder region     right shoulder   • Palpitations    • Pneumonia, organism unspecified(486)    • Pulmonary hypertension (HCC)    • Renal artery stenosis (HCC) 01/2020    had stent placement at [facility] of right Renal Artery Stenosis   • Routine general medical examination at a health care facility    • Sinus bradycardia    • Unspecified  spontaneous  without mention of complication      Past Surgical History:   Procedure Laterality Date   • PACEMAKER INSERTION Left 2012    Medtronic Versa [id number] implanted by Dr. Lewis.   • ABDOMINAL HYSTERECTOMY TOTAL      tahbso in [place] , not cancerous   • DILATION AND CURETTAGE     • TONSILLECTOMY       Family History   Problem Relation Age of Onset   • Heart Disease Father         CAD   • Osteoporosis Mother    • Alcohol abuse Brother    • Cancer Brother         skin     Social History     Socioeconomic History   • Marital status:      Spouse name: Not on file   • Number of children: Not on file   • Years of education: Not on file   • Highest education level: Not on file   Occupational History   • Not on file   Social Needs   • Financial resource strain: Not on file   • Food insecurity     Worry: Not on file     Inability: Not on file   • Transportation needs     Medical: Not on file     Non-medical: Not on file   Tobacco Use   • Smoking status: Former Smoker     Packs/day: 1.00     Years: 30.00     Pack years: 30.00     Types: Cigarettes     Quit date:      Years since quittin.1   • Smokeless tobacco: Never Used   Substance and Sexual Activity   • Alcohol use: Yes   • Drug use: Not on file   • Sexual activity: Not on file   Lifestyle   • Physical activity     Days per week: Not on file     Minutes per session: Not on file   • Stress: Not on file   Relationships   • Social connections     Talks on phone: Not on file     Gets together: Not on file     Attends Baptism service: Not on file     Active member of club or organization: Not on file     Attends meetings of clubs or organizations: Not on file     Relationship status: Not on file   • Intimate partner violence     Fear of current or ex partner: Not on file     Emotionally abused: Not on file     Physically abused: Not on file     Forced sexual activity: Not on file   Other Topics Concern   • Not on file   Social  "History Narrative   • Not on file     Allergies   Allergen Reactions   • Niacin      Bad hot rush, \"on fire\"     Outpatient Encounter Medications as of 2/9/2021   Medication Sig Dispense Refill   • Calcium Carbonate-Vitamin D (CALCIUM 600+D PO) Take  by mouth 2 Times a Day.     • ondansetron (ZOFRAN ODT) 4 MG TABLET DISPERSIBLE Take 1 Tab by mouth every 8 hours as needed. 10 Tab 0   • hydrALAZINE (APRESOLINE) 50 MG Tab Take 1 Tab by mouth 2 Times a Day. 180 Tab 3   • clopidogrel (PLAVIX) 75 MG Tab Take 1 Tab by mouth every day. 90 Tab 3   • amLODIPine (NORVASC) 5 MG Tab Take 1 Tab by mouth every day. 90 Tab 3   • aspirin EC (ECOTRIN) 81 MG Tablet Delayed Response Take 81 mg by mouth every day.     • latanoprost (XALATAN) 0.005 % Solution        No facility-administered encounter medications on file as of 2/9/2021.      Review of Systems   Constitutional: Negative for fever and malaise/fatigue.   Respiratory: Negative for cough and shortness of breath.    Cardiovascular: Negative for chest pain, palpitations, orthopnea, claudication, leg swelling and PND.   Gastrointestinal: Negative for abdominal pain.   Musculoskeletal: Negative for myalgias.   Skin:        Bruising   Neurological: Positive for dizziness (occ when standing ).   All other systems reviewed and are negative.       Objective:   BP (!) 162/78 (BP Location: Left arm, Patient Position: Sitting, BP Cuff Size: Adult)   Pulse 61   Resp 15   Ht 1.6 m (5' 3\")   Wt 61 kg (134 lb 6.4 oz)   SpO2 97%   BMI 23.81 kg/m²     Physical Exam   Constitutional: She is oriented to person, place, and time. She appears well-developed and well-nourished.   HENT:   Head: Normocephalic and atraumatic.   Eyes: Pupils are equal, round, and reactive to light. EOM are normal.   Neck: Normal range of motion. Neck supple. No JVD present.   Cardiovascular: Normal rate, regular rhythm and normal heart sounds.   Pulmonary/Chest: Effort normal and breath sounds normal. No " respiratory distress. She has no wheezes. She has no rales.   Abdominal: Soft. Bowel sounds are normal.   Musculoskeletal:         General: Edema (Trace lower extremity edema) present.   Neurological: She is alert and oriented to person, place, and time.   Skin: Skin is warm and dry.   Psychiatric: She has a normal mood and affect. Her behavior is normal.   Vitals reviewed.    Lab Results   Component Value Date/Time    CHOLSTRLTOT 153 09/21/2018 09:10 AM    LDL 80 09/21/2018 09:10 AM    HDL 61 09/21/2018 09:10 AM    TRIGLYCERIDE 63 09/21/2018 09:10 AM       Lab Results   Component Value Date/Time    SODIUM 143 02/02/2021 08:20 AM    POTASSIUM 4.1 02/02/2021 08:20 AM    CHLORIDE 107 02/02/2021 08:20 AM    CO2 27 02/02/2021 08:20 AM    GLUCOSE 104 (H) 02/02/2021 08:20 AM    BUN 29 (H) 02/02/2021 08:20 AM    CREATININE 1.1 (H) 02/02/2021 08:20 AM     Lab Results   Component Value Date/Time    ALKPHOSPHAT 77 02/02/2021 08:20 AM    ASTSGOT 18 02/02/2021 08:20 AM    ALTSGPT 20 02/02/2021 08:20 AM    TBILIRUBIN 0.4 02/02/2021 08:20 AM        Assessment:     1. High risk medication use  Lipid Profile    Comp Metabolic Panel   2. Paroxysmal atrial fibrillation (HCC)  Lipid Profile    Comp Metabolic Panel   3. Essential hypertension  Lipid Profile    Comp Metabolic Panel   4. Mixed hyperlipidemia  Lipid Profile    Comp Metabolic Panel   5. Sick sinus syndrome (HCC)     6. Cardiac pacemaker in situ     7. History of stent insertion of renal artery     8. HTN (hypertension), malignant         Medical Decision Making:  Today's Assessment / Status / Plan:   1.  Hypertension: BP is elevated   -Patient's daughter to monitor blood pressure at home and let us know if her blood pressures are elevated.   -Patient does have a follow-up appointment with PCP at the beginning of March  -Continue hydralazine 50 mg twice a day  -Continue amlodipine 5 mg daily    2.  Paroxysmal A. Fib:  -Her last device check showed 2 episodes of A. fib,  about 1 minute in length  -Reintroduced Xarelto/other OAC for her A. fib and stroke risk.  -Patient and daughter okay with patient just taking aspirin and Plavix  -Looks like patient had been taken off of Xarelto due to retinal hemorrhage  -Patient continues to be concerned over bruising    3. SSS, s/p PPM:   -Next device check 5/5/2021    4.  Right renal stenosis, s/p Stent on 1/17/2020:  -Continue aspirin 81 mg daily  -Continue clopidogrel 75 mg daily    Recommend CMP and lipid panel in 6 months, patient was taken off her atorvastatin by PCP in the past.    Advance directive On file.    FU in clinic in 6 months with Dr. Matt and labs. Sooner if needed.    Patient verbalizes understanding and agrees with the plan of care.     PLEASE NOTE: This Note was created using voice recognition Software. I have made every reasonable attempt to correct obvious errors, but I expect that there are errors of grammar and possibly content that I did not discover before finalizing the note

## 2021-04-28 ENCOUNTER — TELEPHONE (OUTPATIENT)
Dept: CARDIOLOGY | Facility: MEDICAL CENTER | Age: 86
End: 2021-04-28

## 2021-04-28 NOTE — TELEPHONE ENCOUNTER
HEIDY Nicole.  Josh Clarke R.N.   Please let patient know that her cholesterol levels are abnormal. She was taken off statin in the past, No need to restart but discuss therapeutic lifestyle modifications.       LVM for patient to call back

## 2021-05-05 ENCOUNTER — NON-PROVIDER VISIT (OUTPATIENT)
Dept: CARDIOLOGY | Facility: PHYSICIAN GROUP | Age: 86
End: 2021-05-05
Payer: MEDICARE

## 2021-05-05 VITALS
HEIGHT: 63 IN | HEART RATE: 68 BPM | BODY MASS INDEX: 23.21 KG/M2 | SYSTOLIC BLOOD PRESSURE: 112 MMHG | OXYGEN SATURATION: 97 % | DIASTOLIC BLOOD PRESSURE: 64 MMHG | WEIGHT: 131 LBS

## 2021-05-05 DIAGNOSIS — Z95.0 CARDIAC PACEMAKER IN SITU: ICD-10-CM

## 2021-05-05 DIAGNOSIS — I49.5 SICK SINUS SYNDROME (HCC): ICD-10-CM

## 2021-05-05 DIAGNOSIS — I48.0 PAROXYSMAL ATRIAL FIBRILLATION (HCC): ICD-10-CM

## 2021-05-05 PROCEDURE — 93280 PM DEVICE PROGR EVAL DUAL: CPT | Performed by: NURSE PRACTITIONER

## 2021-05-05 ASSESSMENT — FIBROSIS 4 INDEX: FIB4 SCORE: 1.11

## 2021-05-05 NOTE — PROGRESS NOTES
Device is working normally. 2 mode switching episodes (longest episode 26+ hours on 3/12/2021, 0.5% of total time); she remains on ASA and Plavix only (Xarelto was stopping due to bleeding).  Unable to measure P waves; stable sensing of RV lead. Stable capture of RA and RV leads; stable impedances. Battery longevity is 2.5 years.  No changes are made today.    She does see Dr. Matt in the summer 2021.    FU in 6 months for next PM check with me.

## 2021-08-10 ENCOUNTER — OFFICE VISIT (OUTPATIENT)
Dept: CARDIOLOGY | Facility: MEDICAL CENTER | Age: 86
End: 2021-08-10
Payer: MEDICARE

## 2021-08-10 VITALS
BODY MASS INDEX: 23.92 KG/M2 | OXYGEN SATURATION: 94 % | DIASTOLIC BLOOD PRESSURE: 66 MMHG | SYSTOLIC BLOOD PRESSURE: 110 MMHG | HEART RATE: 78 BPM | HEIGHT: 63 IN | WEIGHT: 135 LBS

## 2021-08-10 DIAGNOSIS — E78.2 MIXED HYPERLIPIDEMIA: ICD-10-CM

## 2021-08-10 DIAGNOSIS — Z98.890 HISTORY OF STENT INSERTION OF RENAL ARTERY: ICD-10-CM

## 2021-08-10 DIAGNOSIS — I49.5 SICK SINUS SYNDROME (HCC): ICD-10-CM

## 2021-08-10 DIAGNOSIS — Z79.899 HIGH RISK MEDICATION USE: ICD-10-CM

## 2021-08-10 DIAGNOSIS — I48.0 PAROXYSMAL ATRIAL FIBRILLATION (HCC): ICD-10-CM

## 2021-08-10 DIAGNOSIS — I10 HTN (HYPERTENSION), MALIGNANT: ICD-10-CM

## 2021-08-10 DIAGNOSIS — Z95.0 CARDIAC PACEMAKER IN SITU: ICD-10-CM

## 2021-08-10 DIAGNOSIS — F03.90 DEMENTIA WITHOUT BEHAVIORAL DISTURBANCE, UNSPECIFIED DEMENTIA TYPE: ICD-10-CM

## 2021-08-10 LAB — EKG IMPRESSION: NORMAL

## 2021-08-10 PROCEDURE — 99214 OFFICE O/P EST MOD 30 MIN: CPT | Performed by: INTERNAL MEDICINE

## 2021-08-10 PROCEDURE — 93000 ELECTROCARDIOGRAM COMPLETE: CPT | Performed by: INTERNAL MEDICINE

## 2021-08-10 RX ORDER — LATANOPROST 50 UG/ML
SOLUTION/ DROPS OPHTHALMIC
COMMUNITY
Start: 2021-08-06

## 2021-08-10 ASSESSMENT — ENCOUNTER SYMPTOMS
BLURRED VISION: 0
FALLS: 0
HALLUCINATIONS: 0
COUGH: 0
ABDOMINAL PAIN: 0
ORTHOPNEA: 0
SHORTNESS OF BREATH: 0
SPEECH CHANGE: 0
BLOOD IN STOOL: 0
VOMITING: 0
EYE PAIN: 0
CHILLS: 0
EYE DISCHARGE: 0
MYALGIAS: 0
SENSORY CHANGE: 0
WEIGHT LOSS: 0
PALPITATIONS: 0
LOSS OF CONSCIOUSNESS: 0
NAUSEA: 0
PND: 0
BRUISES/BLEEDS EASILY: 0
CLAUDICATION: 0
MEMORY LOSS: 1
DEPRESSION: 0
HEADACHES: 0
DOUBLE VISION: 0
FEVER: 0
DIZZINESS: 0

## 2021-08-10 ASSESSMENT — FIBROSIS 4 INDEX: FIB4 SCORE: 1.11

## 2021-08-10 NOTE — PROGRESS NOTES
Chief Complaint   Patient presents with   • Pulmonary Hypertension   • HTN (Controlled)   • Atrial Fibrillation   • Congestive Heart Failure       Subjective:   Carissa Marin is a 89 y.o. female who presents today for PAF, SSS s/p PPM, HTN.      Patient was in the hospital at [facility] in 2020 because of hypertensive emergency.  Per report, she underwent renal stenting.  That is why she is on Plavix at this time.  She also had cardiac testing which did not find any abnormalities per report.  We are still obtaining her records from [facility] at this time.    She is feeling tired and more confused for the last 24 hours. No chest pain, no dyspnea.    Not on water pill for over a year.    I have independently interpreted and reviewed blood tests results with patient in clinic which shows GFR of 47.    Past Medical History:   Diagnosis Date   • Abdominal pain, other specified site     pelvic   • ASTHMA    • Atrial fibrillation (HCC)    • Colon polyps    • Herpes simplex without mention of complication    • Hyperglycemia    • Hyperlipidemia    • Hypertension    • Lump or mass in breast    • Need for prophylactic hormone replacement therapy (postmenopausal)    • Nonspecific abnormal results of thyroid function study    • Osteoarthrosis, unspecified whether generalized or localized, lower leg    • Pain in joint, shoulder region     right shoulder   • Palpitations    • Pneumonia, organism unspecified(486)    • Pulmonary hypertension (HCC)    • Renal artery stenosis (HCC) 2020    had stent placement at [facility] of right Renal Artery Stenosis   • Routine general medical examination at a health care facility    • Sinus bradycardia    • Unspecified spontaneous  without mention of complication      Past Surgical History:   Procedure Laterality Date   • PACEMAKER INSERTION Left 2012    Medtronic Versa [id number] implanted by Dr. Lewis.   • ABDOMINAL HYSTERECTOMY TOTAL       tahbso in ileana , not cancerous   • DILATION AND CURETTAGE     • TONSILLECTOMY       Family History   Problem Relation Age of Onset   • Heart Disease Father 69        CAD   • Osteoporosis Mother    • Alcohol abuse Brother    • Cancer Brother         skin.     • Other Son         Her son Dominique states that he  from overall poor health, poor self-care.     Social History     Socioeconomic History   • Marital status:      Spouse name: Not on file   • Number of children: Not on file   • Years of education: Not on file   • Highest education level: Not on file   Occupational History   • Not on file   Tobacco Use   • Smoking status: Former Smoker     Packs/day: 1.00     Years: 30.00     Pack years: 30.00     Types: Cigarettes     Quit date:      Years since quittin.6   • Smokeless tobacco: Never Used   Vaping Use   • Vaping Use: Never used   Substance and Sexual Activity   • Alcohol use: Not Currently   • Drug use: Not on file   • Sexual activity: Not on file   Other Topics Concern   • Not on file   Social History Narrative   • Not on file     Social Determinants of Health     Financial Resource Strain:    • Difficulty of Paying Living Expenses:    Food Insecurity:    • Worried About Running Out of Food in the Last Year:    • Ran Out of Food in the Last Year:    Transportation Needs:    • Lack of Transportation (Medical):    • Lack of Transportation (Non-Medical):    Physical Activity:    • Days of Exercise per Week:    • Minutes of Exercise per Session:    Stress:    • Feeling of Stress :    Social Connections:    • Frequency of Communication with Friends and Family:    • Frequency of Social Gatherings with Friends and Family:    • Attends [employer] Services:    • Active Member of Clubs or Organizations:    • Attends Club or Organization Meetings:    • Marital Status:    Intimate Partner Violence:    • Fear of Current or Ex-Partner:    • Emotionally Abused:    • Physically Abused:    • Sexually Abused:   "    Allergies   Allergen Reactions   • Niacin      Bad hot rush, \"on fire\"     Outpatient Encounter Medications as of 8/10/2021   Medication Sig Dispense Refill   • latanoprost (XALATAN) 0.005 % Solution      • Calcium Carbonate-Vitamin D (CALCIUM 600+D PO) Take  by mouth 2 Times a Day.     • hydrALAZINE (APRESOLINE) 50 MG Tab Take 1 Tab by mouth 2 Times a Day. 180 Tab 3   • clopidogrel (PLAVIX) 75 MG Tab Take 1 Tab by mouth every day. 90 Tab 3   • amLODIPine (NORVASC) 5 MG Tab Take 1 Tab by mouth every day. 90 Tab 3   • aspirin EC (ECOTRIN) 81 MG Tablet Delayed Response Take 81 mg by mouth every day.       No facility-administered encounter medications on file as of 8/10/2021.     Review of Systems   Constitutional: Positive for malaise/fatigue. Negative for chills, fever and weight loss.   HENT: Negative for ear discharge, ear pain, hearing loss and nosebleeds.    Eyes: Negative for blurred vision, double vision, pain and discharge.   Respiratory: Negative for cough and shortness of breath.    Cardiovascular: Negative for chest pain, palpitations, orthopnea, claudication, leg swelling and PND.   Gastrointestinal: Negative for abdominal pain, blood in stool, melena, nausea and vomiting.   Genitourinary: Negative for dysuria and hematuria.   Musculoskeletal: Negative for falls, joint pain and myalgias.   Skin: Negative for itching and rash.   Neurological: Negative for dizziness, sensory change, speech change, loss of consciousness and headaches.   Endo/Heme/Allergies: Negative for environmental allergies. Does not bruise/bleed easily.   Psychiatric/Behavioral: Positive for memory loss. Negative for depression, hallucinations and suicidal ideas.        Objective:   /66 (BP Location: Right arm, Patient Position: Sitting, BP Cuff Size: Adult)   Pulse 78   Ht 1.6 m (5' 3\")   Wt 61.2 kg (135 lb)   SpO2 94%   BMI 23.91 kg/m²     Physical Exam   Constitutional: No distress.   HENT:   Head: Normocephalic and " atraumatic.   Eyes: Right eye exhibits no discharge. Left eye exhibits no discharge.   Pulmonary/Chest: Breath sounds normal. No respiratory distress.   Abdominal: Bowel sounds are normal. She exhibits no distension. There is no abdominal tenderness.   Musculoskeletal:         General: No tenderness.      Comments: Better swelling control.   Neurological: She is alert. No cranial nerve deficit.   Psychiatric: Her behavior is normal.   demented   Nursing note and vitals reviewed.      Assessment:     1. Paroxysmal atrial fibrillation (HCC)  EKG   2. Sick sinus syndrome (HCC)     3. Cardiac pacemaker in situ     4. High risk medication use     5. Mixed hyperlipidemia     6. History of stent insertion of renal artery     7. HTN (hypertension), malignant     8. Dementia without behavioral disturbance, unspecified dementia type (HCC)         Medical Decision Making:  Today's Assessment / Status / Plan:      SSS s/p PPM:  Pacemaker is working properly, I personally interpreted the result.     Paroxysmal atrial fibrillation no progressed to more sustained:  I personally interpreted the EKG traching which showed presence of atrial fibrillation. Rate control strategy for now.  Continue Asa and Clopidogrel.  No anticoagulation to avoid triple therapy.     Hypertension:  We will continue hydralazine 50 mg p.o. twice a day along with amlodipine 5 mg p.o. once a day.  Blood pressure is well controlled.     Renal stent:  Continue aspirin and Plavix.    Overall, the less invasive procedures we do, the better for her.    Follow up with primary care provider for dementia care.    Return as needed.

## 2021-08-25 DIAGNOSIS — I10 HTN (HYPERTENSION), MALIGNANT: ICD-10-CM

## 2021-08-25 RX ORDER — HYDRALAZINE HYDROCHLORIDE 50 MG/1
TABLET, FILM COATED ORAL
Qty: 180 TABLET | Refills: 3 | Status: SHIPPED | OUTPATIENT
Start: 2021-08-25

## 2021-09-19 DIAGNOSIS — I10 ESSENTIAL HYPERTENSION: ICD-10-CM

## 2021-09-20 RX ORDER — AMLODIPINE BESYLATE 5 MG/1
TABLET ORAL
Qty: 90 TABLET | Refills: 3 | Status: SHIPPED | OUTPATIENT
Start: 2021-09-20

## 2021-09-23 PROBLEM — F03.90 DEMENTIA WITHOUT BEHAVIORAL DISTURBANCE (HCC): Status: ACTIVE | Noted: 2021-09-23

## 2021-09-23 PROBLEM — Z86.73 HISTORY OF STROKE: Status: ACTIVE | Noted: 2021-09-23

## 2021-09-23 PROBLEM — N18.31 STAGE 3A CHRONIC KIDNEY DISEASE: Status: ACTIVE | Noted: 2021-09-23

## 2021-11-18 RX ORDER — CLOPIDOGREL BISULFATE 75 MG/1
TABLET ORAL
Qty: 90 TABLET | Refills: 3 | OUTPATIENT
Start: 2021-11-18

## 2024-02-22 NOTE — TELEPHONE ENCOUNTER
Faxed OV note and AVS from yesterday with instructions to stop furosemide and start torsemide circled. Faxed to [phone]. Receipt confirmed.   
Neck , no lymphadenopathy
WOO Marie, with [facility] in [place] pt's assisted living is calling on behalf of pt. States they need order faxed stating pt's furosemide was discontinued. F: #[phone].    
No